# Patient Record
Sex: MALE | Race: OTHER | HISPANIC OR LATINO | URBAN - METROPOLITAN AREA
[De-identification: names, ages, dates, MRNs, and addresses within clinical notes are randomized per-mention and may not be internally consistent; named-entity substitution may affect disease eponyms.]

---

## 2022-08-18 ENCOUNTER — INPATIENT (INPATIENT)
Facility: HOSPITAL | Age: 67
LOS: 7 days | Discharge: NURSING, REHAB OR LONG-TERM CARE | DRG: 919 | End: 2022-08-26
Attending: HOSPITALIST | Admitting: HOSPITALIST
Payer: MEDICARE

## 2022-08-18 VITALS
SYSTOLIC BLOOD PRESSURE: 97 MMHG | OXYGEN SATURATION: 92 % | TEMPERATURE: 99 F | DIASTOLIC BLOOD PRESSURE: 56 MMHG | RESPIRATION RATE: 20 BRPM | HEART RATE: 80 BPM | WEIGHT: 265 LBS | HEIGHT: 67 IN

## 2022-08-18 DIAGNOSIS — D50.0 IRON DEFICIENCY ANEMIA SECONDARY TO BLOOD LOSS (CHRONIC): ICD-10-CM

## 2022-08-18 DIAGNOSIS — Z98.890 OTHER SPECIFIED POSTPROCEDURAL STATES: Chronic | ICD-10-CM

## 2022-08-18 DIAGNOSIS — Z98.1 ARTHRODESIS STATUS: Chronic | ICD-10-CM

## 2022-08-18 LAB
ABO RH CONFIRMATION: SIGNIFICANT CHANGE UP
ALBUMIN SERPL ELPH-MCNC: 2.2 G/DL — LOW (ref 3.3–5)
ALP SERPL-CCNC: 115 U/L — SIGNIFICANT CHANGE UP (ref 40–120)
ALT FLD-CCNC: 36 U/L — SIGNIFICANT CHANGE UP (ref 12–78)
ANION GAP SERPL CALC-SCNC: 4 MMOL/L — LOW (ref 5–17)
APPEARANCE UR: CLEAR — SIGNIFICANT CHANGE UP
AST SERPL-CCNC: 24 U/L — SIGNIFICANT CHANGE UP (ref 15–37)
BASOPHILS # BLD AUTO: 0.02 K/UL — SIGNIFICANT CHANGE UP (ref 0–0.2)
BASOPHILS NFR BLD AUTO: 0.4 % — SIGNIFICANT CHANGE UP (ref 0–2)
BILIRUB SERPL-MCNC: 0.3 MG/DL — SIGNIFICANT CHANGE UP (ref 0.2–1.2)
BILIRUB UR-MCNC: NEGATIVE — SIGNIFICANT CHANGE UP
BUN SERPL-MCNC: 17 MG/DL — SIGNIFICANT CHANGE UP (ref 7–23)
CALCIUM SERPL-MCNC: 9.2 MG/DL — SIGNIFICANT CHANGE UP (ref 8.5–10.1)
CHLORIDE SERPL-SCNC: 103 MMOL/L — SIGNIFICANT CHANGE UP (ref 96–108)
CO2 SERPL-SCNC: 33 MMOL/L — HIGH (ref 22–31)
COLOR SPEC: YELLOW — SIGNIFICANT CHANGE UP
CREAT SERPL-MCNC: 1.08 MG/DL — SIGNIFICANT CHANGE UP (ref 0.5–1.3)
DIFF PNL FLD: NEGATIVE — SIGNIFICANT CHANGE UP
EGFR: 76 ML/MIN/1.73M2 — SIGNIFICANT CHANGE UP
EOSINOPHIL # BLD AUTO: 0.1 K/UL — SIGNIFICANT CHANGE UP (ref 0–0.5)
EOSINOPHIL NFR BLD AUTO: 2 % — SIGNIFICANT CHANGE UP (ref 0–6)
GLUCOSE SERPL-MCNC: 103 MG/DL — HIGH (ref 70–99)
GLUCOSE UR QL: NEGATIVE — SIGNIFICANT CHANGE UP
HCT VFR BLD CALC: 24.1 % — LOW (ref 39–50)
HCT VFR BLD CALC: 24.4 % — LOW (ref 39–50)
HGB BLD-MCNC: 7.5 G/DL — LOW (ref 13–17)
HGB BLD-MCNC: 7.6 G/DL — LOW (ref 13–17)
IMM GRANULOCYTES NFR BLD AUTO: 0.8 % — SIGNIFICANT CHANGE UP (ref 0–1.5)
KETONES UR-MCNC: NEGATIVE — SIGNIFICANT CHANGE UP
LEUKOCYTE ESTERASE UR-ACNC: NEGATIVE — SIGNIFICANT CHANGE UP
LYMPHOCYTES # BLD AUTO: 0.96 K/UL — LOW (ref 1–3.3)
LYMPHOCYTES # BLD AUTO: 18.8 % — SIGNIFICANT CHANGE UP (ref 13–44)
MCHC RBC-ENTMCNC: 28.8 PG — SIGNIFICANT CHANGE UP (ref 27–34)
MCHC RBC-ENTMCNC: 28.8 PG — SIGNIFICANT CHANGE UP (ref 27–34)
MCHC RBC-ENTMCNC: 31.1 GM/DL — LOW (ref 32–36)
MCHC RBC-ENTMCNC: 31.1 GM/DL — LOW (ref 32–36)
MCV RBC AUTO: 92.4 FL — SIGNIFICANT CHANGE UP (ref 80–100)
MCV RBC AUTO: 92.7 FL — SIGNIFICANT CHANGE UP (ref 80–100)
MONOCYTES # BLD AUTO: 0.38 K/UL — SIGNIFICANT CHANGE UP (ref 0–0.9)
MONOCYTES NFR BLD AUTO: 7.4 % — SIGNIFICANT CHANGE UP (ref 2–14)
NEUTROPHILS # BLD AUTO: 3.61 K/UL — SIGNIFICANT CHANGE UP (ref 1.8–7.4)
NEUTROPHILS NFR BLD AUTO: 70.6 % — SIGNIFICANT CHANGE UP (ref 43–77)
NITRITE UR-MCNC: NEGATIVE — SIGNIFICANT CHANGE UP
NT-PROBNP SERPL-SCNC: 108 PG/ML — SIGNIFICANT CHANGE UP (ref 0–125)
PH UR: 7 — SIGNIFICANT CHANGE UP (ref 5–8)
PLATELET # BLD AUTO: 339 K/UL — SIGNIFICANT CHANGE UP (ref 150–400)
PLATELET # BLD AUTO: 352 K/UL — SIGNIFICANT CHANGE UP (ref 150–400)
POTASSIUM SERPL-MCNC: 4.4 MMOL/L — SIGNIFICANT CHANGE UP (ref 3.5–5.3)
POTASSIUM SERPL-SCNC: 4.4 MMOL/L — SIGNIFICANT CHANGE UP (ref 3.5–5.3)
PROT SERPL-MCNC: 6 GM/DL — SIGNIFICANT CHANGE UP (ref 6–8.3)
PROT UR-MCNC: NEGATIVE — SIGNIFICANT CHANGE UP
RBC # BLD: 2.6 M/UL — LOW (ref 4.2–5.8)
RBC # BLD: 2.64 M/UL — LOW (ref 4.2–5.8)
RBC # FLD: 15.1 % — HIGH (ref 10.3–14.5)
RBC # FLD: 15.1 % — HIGH (ref 10.3–14.5)
SODIUM SERPL-SCNC: 140 MMOL/L — SIGNIFICANT CHANGE UP (ref 135–145)
SP GR SPEC: 1 — LOW (ref 1.01–1.02)
TROPONIN I, HIGH SENSITIVITY RESULT: 6.48 NG/L — SIGNIFICANT CHANGE UP
UROBILINOGEN FLD QL: NEGATIVE — SIGNIFICANT CHANGE UP
WBC # BLD: 4.28 K/UL — SIGNIFICANT CHANGE UP (ref 3.8–10.5)
WBC # BLD: 5.11 K/UL — SIGNIFICANT CHANGE UP (ref 3.8–10.5)
WBC # FLD AUTO: 4.28 K/UL — SIGNIFICANT CHANGE UP (ref 3.8–10.5)
WBC # FLD AUTO: 5.11 K/UL — SIGNIFICANT CHANGE UP (ref 3.8–10.5)

## 2022-08-18 PROCEDURE — 85018 HEMOGLOBIN: CPT

## 2022-08-18 PROCEDURE — 85014 HEMATOCRIT: CPT

## 2022-08-18 PROCEDURE — 86850 RBC ANTIBODY SCREEN: CPT

## 2022-08-18 PROCEDURE — 99053 MED SERV 10PM-8AM 24 HR FAC: CPT

## 2022-08-18 PROCEDURE — 72100 X-RAY EXAM L-S SPINE 2/3 VWS: CPT | Mod: 26

## 2022-08-18 PROCEDURE — 93005 ELECTROCARDIOGRAM TRACING: CPT

## 2022-08-18 PROCEDURE — 80053 COMPREHEN METABOLIC PANEL: CPT

## 2022-08-18 PROCEDURE — 99285 EMERGENCY DEPT VISIT HI MDM: CPT

## 2022-08-18 PROCEDURE — 93971 EXTREMITY STUDY: CPT | Mod: RT

## 2022-08-18 PROCEDURE — 97162 PT EVAL MOD COMPLEX 30 MIN: CPT | Mod: GP

## 2022-08-18 PROCEDURE — 85027 COMPLETE CBC AUTOMATED: CPT

## 2022-08-18 PROCEDURE — 72158 MRI LUMBAR SPINE W/O & W/DYE: CPT

## 2022-08-18 PROCEDURE — 72157 MRI CHEST SPINE W/O & W/DYE: CPT

## 2022-08-18 PROCEDURE — 80048 BASIC METABOLIC PNL TOTAL CA: CPT

## 2022-08-18 PROCEDURE — 86901 BLOOD TYPING SEROLOGIC RH(D): CPT

## 2022-08-18 PROCEDURE — 86900 BLOOD TYPING SEROLOGIC ABO: CPT

## 2022-08-18 PROCEDURE — 36430 TRANSFUSION BLD/BLD COMPNT: CPT

## 2022-08-18 PROCEDURE — 85025 COMPLETE CBC W/AUTO DIFF WBC: CPT

## 2022-08-18 PROCEDURE — 93971 EXTREMITY STUDY: CPT | Mod: 26,RT

## 2022-08-18 PROCEDURE — 86920 COMPATIBILITY TEST SPIN: CPT

## 2022-08-18 PROCEDURE — 82570 ASSAY OF URINE CREATININE: CPT

## 2022-08-18 PROCEDURE — 81003 URINALYSIS AUTO W/O SCOPE: CPT

## 2022-08-18 PROCEDURE — 74177 CT ABD & PELVIS W/CONTRAST: CPT | Mod: 26

## 2022-08-18 PROCEDURE — 74177 CT ABD & PELVIS W/CONTRAST: CPT

## 2022-08-18 PROCEDURE — U0005: CPT

## 2022-08-18 PROCEDURE — 97530 THERAPEUTIC ACTIVITIES: CPT | Mod: GP

## 2022-08-18 PROCEDURE — 84156 ASSAY OF PROTEIN URINE: CPT

## 2022-08-18 PROCEDURE — 86803 HEPATITIS C AB TEST: CPT

## 2022-08-18 PROCEDURE — U0003: CPT

## 2022-08-18 PROCEDURE — 72100 X-RAY EXAM L-S SPINE 2/3 VWS: CPT

## 2022-08-18 PROCEDURE — 93306 TTE W/DOPPLER COMPLETE: CPT

## 2022-08-18 PROCEDURE — 97116 GAIT TRAINING THERAPY: CPT | Mod: GP

## 2022-08-18 PROCEDURE — 36415 COLL VENOUS BLD VENIPUNCTURE: CPT

## 2022-08-18 PROCEDURE — 99223 1ST HOSP IP/OBS HIGH 75: CPT

## 2022-08-18 PROCEDURE — A9579: CPT

## 2022-08-18 PROCEDURE — 71045 X-RAY EXAM CHEST 1 VIEW: CPT | Mod: 26

## 2022-08-18 PROCEDURE — P9016: CPT

## 2022-08-18 PROCEDURE — 76870 US EXAM SCROTUM: CPT

## 2022-08-18 RX ORDER — ACETAMINOPHEN 500 MG
650 TABLET ORAL ONCE
Refills: 0 | Status: COMPLETED | OUTPATIENT
Start: 2022-08-18 | End: 2022-08-18

## 2022-08-18 RX ORDER — HYDROMORPHONE HYDROCHLORIDE 2 MG/ML
0.5 INJECTION INTRAMUSCULAR; INTRAVENOUS; SUBCUTANEOUS EVERY 4 HOURS
Refills: 0 | Status: DISCONTINUED | OUTPATIENT
Start: 2022-08-18 | End: 2022-08-25

## 2022-08-18 RX ORDER — NALOXONE HYDROCHLORIDE 4 MG/.1ML
0 SPRAY NASAL
Qty: 0 | Refills: 0 | DISCHARGE

## 2022-08-18 RX ORDER — ONDANSETRON 8 MG/1
4 TABLET, FILM COATED ORAL EVERY 8 HOURS
Refills: 0 | Status: DISCONTINUED | OUTPATIENT
Start: 2022-08-18 | End: 2022-08-26

## 2022-08-18 RX ORDER — HYDROMORPHONE HYDROCHLORIDE 2 MG/ML
0.25 INJECTION INTRAMUSCULAR; INTRAVENOUS; SUBCUTANEOUS EVERY 4 HOURS
Refills: 0 | Status: DISCONTINUED | OUTPATIENT
Start: 2022-08-18 | End: 2022-08-25

## 2022-08-18 RX ORDER — ACETAMINOPHEN 500 MG
1000 TABLET ORAL EVERY 8 HOURS
Refills: 0 | Status: DISCONTINUED | OUTPATIENT
Start: 2022-08-18 | End: 2022-08-26

## 2022-08-18 RX ORDER — POLYETHYLENE GLYCOL 3350 17 G/17G
17 POWDER, FOR SOLUTION ORAL DAILY
Refills: 0 | Status: DISCONTINUED | OUTPATIENT
Start: 2022-08-18 | End: 2022-08-26

## 2022-08-18 RX ORDER — APIXABAN 2.5 MG/1
1 TABLET, FILM COATED ORAL
Qty: 0 | Refills: 0 | DISCHARGE

## 2022-08-18 RX ORDER — HYDROMORPHONE HYDROCHLORIDE 2 MG/ML
2 INJECTION INTRAMUSCULAR; INTRAVENOUS; SUBCUTANEOUS ONCE
Refills: 0 | Status: DISCONTINUED | OUTPATIENT
Start: 2022-08-18 | End: 2022-08-18

## 2022-08-18 RX ORDER — SENNA PLUS 8.6 MG/1
2 TABLET ORAL
Qty: 0 | Refills: 0 | DISCHARGE

## 2022-08-18 RX ORDER — POLYETHYLENE GLYCOL 3350 17 G/17G
17 POWDER, FOR SOLUTION ORAL
Qty: 0 | Refills: 0 | DISCHARGE

## 2022-08-18 RX ORDER — LANOLIN ALCOHOL/MO/W.PET/CERES
3 CREAM (GRAM) TOPICAL AT BEDTIME
Refills: 0 | Status: DISCONTINUED | OUTPATIENT
Start: 2022-08-18 | End: 2022-08-26

## 2022-08-18 RX ORDER — HYDROMORPHONE HYDROCHLORIDE 2 MG/ML
1 INJECTION INTRAMUSCULAR; INTRAVENOUS; SUBCUTANEOUS
Qty: 0 | Refills: 0 | DISCHARGE

## 2022-08-18 RX ORDER — AMIODARONE HYDROCHLORIDE 400 MG/1
1 TABLET ORAL
Qty: 0 | Refills: 0 | DISCHARGE
Start: 2022-08-18

## 2022-08-18 RX ORDER — LOSARTAN POTASSIUM 100 MG/1
100 TABLET, FILM COATED ORAL DAILY
Refills: 0 | Status: DISCONTINUED | OUTPATIENT
Start: 2022-08-18 | End: 2022-08-26

## 2022-08-18 RX ORDER — FUROSEMIDE 40 MG
20 TABLET ORAL ONCE
Refills: 0 | Status: COMPLETED | OUTPATIENT
Start: 2022-08-18 | End: 2022-08-18

## 2022-08-18 RX ORDER — AMIODARONE HYDROCHLORIDE 400 MG/1
200 TABLET ORAL DAILY
Refills: 0 | Status: DISCONTINUED | OUTPATIENT
Start: 2022-08-18 | End: 2022-08-26

## 2022-08-18 RX ORDER — SENNA PLUS 8.6 MG/1
2 TABLET ORAL AT BEDTIME
Refills: 0 | Status: DISCONTINUED | OUTPATIENT
Start: 2022-08-18 | End: 2022-08-26

## 2022-08-18 RX ORDER — NALOXONE HYDROCHLORIDE 4 MG/.1ML
0.4 SPRAY NASAL ONCE
Refills: 0 | Status: DISCONTINUED | OUTPATIENT
Start: 2022-08-18 | End: 2022-08-26

## 2022-08-18 RX ORDER — HYDROMORPHONE HYDROCHLORIDE 2 MG/ML
2 INJECTION INTRAMUSCULAR; INTRAVENOUS; SUBCUTANEOUS EVERY 4 HOURS
Refills: 0 | Status: DISCONTINUED | OUTPATIENT
Start: 2022-08-18 | End: 2022-08-25

## 2022-08-18 RX ADMIN — Medication 1000 MILLIGRAM(S): at 21:09

## 2022-08-18 RX ADMIN — HYDROMORPHONE HYDROCHLORIDE 2 MILLIGRAM(S): 2 INJECTION INTRAMUSCULAR; INTRAVENOUS; SUBCUTANEOUS at 19:28

## 2022-08-18 RX ADMIN — HYDROMORPHONE HYDROCHLORIDE 2 MILLIGRAM(S): 2 INJECTION INTRAMUSCULAR; INTRAVENOUS; SUBCUTANEOUS at 01:23

## 2022-08-18 RX ADMIN — SENNA PLUS 2 TABLET(S): 8.6 TABLET ORAL at 21:10

## 2022-08-18 RX ADMIN — Medication 150 MILLIGRAM(S): at 21:10

## 2022-08-18 RX ADMIN — Medication 1000 MILLIGRAM(S): at 22:27

## 2022-08-18 RX ADMIN — Medication 650 MILLIGRAM(S): at 11:35

## 2022-08-18 RX ADMIN — HYDROMORPHONE HYDROCHLORIDE 2 MILLIGRAM(S): 2 INJECTION INTRAMUSCULAR; INTRAVENOUS; SUBCUTANEOUS at 09:54

## 2022-08-18 NOTE — CONSULT NOTE ADULT - ATTENDING COMMENTS
Above note reviewed.    The patient recently underwent a T11 to pelvis spinal decompression and fusion performed at [facility].  The patient had a complicated postoperative course.  The patient has been in rehab.  Since the surgery, the patient and patient's family report that he has had a right-sided radicular pain.  They report they have recently been unhappy with the patient's pain control.  They deny any significant change recently regarding his surgical site pain, radicular symptoms, numbness, paresthesias, or other complaints.    The imaging performed thus far has been reassuring.  CT abdomen and pelvis does not show any active extravasation.  There is comment of a retroperitoneal bleed that is small.  The T11 to pelvis spinal decompression and fusion appears to be in excellent alignment and hardware positioning.  The MRI of thoracic and lumbar spine with and without contrast is unfortunately limited secondary to artifact, but does not show any definite evidence of concerning fluid collection or other compressive etiology.    Recommend further medical investigation and management of the patient's anemia. There is no acute orthopedic spine intervention necessitated at this time.    The patient's primary surgeon from Health system has been contacted and is aware of the patient's hospital stay.    Rey Terry DO  Orthopedic and Spine Surgeon  [facility]  [email or web address] Above note reviewed.    The patient recently underwent a T11 to pelvis spinal decompression and fusion performed at [facility].  The patient had a complicated postoperative course.  The patient has been in rehab.  Since the surgery, the patient and patient's family report that he has had a right-sided radicular pain.  They report they have recently been unhappy with the patient's pain control.  They deny any significant change recently regarding his surgical site pain, radicular symptoms, numbness, paresthesias, or other complaints.    The imaging performed thus far has been reassuring.  CT abdomen and pelvis does not show any active extravasation.  There is comment of a retroperitoneal bleed that is small.  The T11 to pelvis spinal decompression and fusion appears to be in excellent alignment and hardware positioning.  The MRI of thoracic and lumbar spine with and without contrast is unfortunately limited secondary to artifact, but does not show any definite evidence of concerning fluid collection or other compressive etiology.    Recommend further medical investigation and management of the patient's anemia. There is no acute orthopedic spine intervention necessitated at this time.    The patient's primary surgeon from [facility] has been contacted and is aware of the patient's hospital stay. The patient may follow-up with his primary surgeon after discharge.  I am more than happy to follow the patient if he so chooses.      Rey Terry, DO  Orthopedic and Spine Surgeon  [facility]  [email or web address]

## 2022-08-18 NOTE — ED PROVIDER NOTE - CLINICAL SUMMARY MEDICAL DECISION MAKING FREE TEXT BOX
pt with back pain from recent spinal surgery no recent trauma, no fever  familyy unhappy with [facility] due to medicaion not being given for pain tonight, requesting a new  rehab will give pt meds for pain get sw in am

## 2022-08-18 NOTE — H&P ADULT - ASSESSMENT
65 y/o M PMHx significant for Hypertension, Atrial fibrillation on Apixaban, morbid obesity, and spinal stenosis complicated by lumbar radiculopathy who was recently admitted to [facility] (7/27/2022 - 8/17/2022) s/p elective two stage spine surgery => 7/27/2022 Stage 1 lumbar spine fusion anterior approach single level, with Lateral lumbar interbody fusion posterior approach multiple levels, and left globus robot assisted lumbar spine, and on 7/29/2022 => Stage 2 Posterior Lumbar Laminectomy Fusion Multiple Level, Iliac Fixation, TLIF L1-L2 (Posterior), Globus Robot Assisted Spine (Posterior). The patient was discharged on 8/17/2022 to [facility] where the patient was found to have worsening back and abdominal pain with distention. As per the patient and patient's daughter at the bedside they stated that the patient was in pain en route to [facility] and while being admitted. In the ED case management was consulted as the patient and family were not satisfied with the care at the facility.  67 y/o M PMHx significant for Hypertension, Atrial fibrillation on Apixaban, morbid obesity, and spinal stenosis complicated by lumbar radiculopathy who was recently admitted to [facility] (7/27/2022 - 8/17/2022) s/p elective two stage spine surgery => 7/27/2022 Stage 1 lumbar spine fusion anterior approach single level, with Lateral lumbar interbody fusion posterior approach multiple levels, and left globus robot assisted lumbar spine, and on 7/29/2022 => Stage 2 Posterior Lumbar Laminectomy Fusion Multiple Level, Iliac Fixation, TLIF L1-L2 (Posterior), Globus Robot Assisted Spine (Posterior). The patient was discharged on 8/17/2022 to [facility] where the patient was found to have worsening back pain with radiation to the right leg, and abdominal pain with distention. As per the patient and patient's daughter at the bedside they stated that the patient was in pain en route to [facility] and while being admitted. In the ED case management was consulted as the patient and family were not satisfied with the care at the facility.     #Anemia with Severe Lower Back Pain Radiating to the Right Leg   ~admit to Medicine  ~Orthopedics/Spine consultation greatly appreciated  ~Type and Screen  ~transfuse prn  ~serial CBCs  ~cont. pain management PRN  ~NWB/WBAT   ~Keep splint/dressing clean and dry.   ~per Orthopedics => do not remove dressing/splint until follow up in the office.   ~f/u CT LS spine  ~f/u CTA Abdomen/Pelvis as concern for possible retroperitoneal bleed vs epidural hematoma   ~cont. NeuroVascular Checks to monitor for compartment signs    #Atrial Fibrillation  ~will hold Apixaban 5mg po bid for now  ~cont. Amiodarone 200mg po daily    #Hypertension  ~patient with labile blood pressures  ~will hold HCTZ (takes 50mg po daily)    #Severe Protein Calorie Malnutrition  ~Albumin 2.2  ~patient w/ notable third spacing  ~f/u w/ Nutrition in the am    #Vte ppx  ~cont. SCDs for now  ~as above will hold Apixaban

## 2022-08-18 NOTE — PATIENT PROFILE ADULT - FALL HARM RISK - HARM RISK INTERVENTIONS
Assistance with ambulation/Assistance OOB with selected safe patient handling equipment/Communicate Risk of Fall with Harm to all staff/Discuss with provider need for PT consult/Monitor gait and stability/Provide patient with walking aids - walker, cane, crutches/Reinforce activity limits and safety measures with patient and family/Sit up slowly, dangle for a short time, stand at bedside before walking/Tailored Fall Risk Interventions/Use of alarms - bed, chair and/or voice tab/Visual Cue: Yellow wristband and red socks/Bed in lowest position, wheels locked, appropriate side rails in place/Call bell, personal items and telephone in reach/Instruct patient to call for assistance before getting out of bed or chair/Non-slip footwear when patient is out of bed/[place] to call system/Physically safe environment - no spills, clutter or unnecessary equipment/Purposeful Proactive Rounding/Room/bathroom lighting operational, light cord in reach

## 2022-08-18 NOTE — CONSULT NOTE ADULT - SUBJECTIVE AND OBJECTIVE BOX
Pt Name: SOUTH MENENDEZ    MRN: [id number]    HPI: Patient is a 66y male with recent surg history of two phase spinal lumbar fusion (7/27, 7/29) c/w common iliac vein repair for which pt had protracted ICU course 7/29-8/17 for Afib, anemia, new onset MR on TTE w BL LE swelling, UTI, and HSV2. Pt was transferred from [facility] ICU to [facility] in [place], however pt was noted to have severe pain at rehab,     PAST MEDICAL & SURGICAL HISTORY:  HTN (hypertension)      Chronic atrial fibrillation      Status post lumbar and lumbosacral fusion by anterior technique      Status post lumbar spine operation  7/27/2022 Stage 1 lumbar spine fusion anterior approach single level, Lateral lumbar interbody fusion posterior approach multiple levels, and left globus robot assisted lumbar spine     7/29/2022 Stage 2 Posterior Lumbar Laminectomy Fusion Multiple Level, Iliac Fixation, TLIF L1-L2 (Posterior), Globus Robot Assisted Spine (Posterior)          Allergies: No Known Allergies      Ambulation: Baseline Ambulation [ ] independent [ ] With Cane [ ] With Walker [ ]  Bedbound [ ] Pivot transfers to Wheelchair only                          7.6    4.28  )-----------( 352      ( 18 Aug 2022 21:16 )             24.4     08-18    140  |  103  |  17  ----------------------------<  103<H>  4.4   |  33<H>  |  1.08    Ca    9.2      18 Aug 2022 11:33    TPro  6.0  /  Alb  2.2<L>  /  TBili  0.3  /  DBili  x   /  AST  24  /  ALT  36  /  AlkPhos  115  08-18          PHYSICAL EXAM:    Vital Signs Last 24 Hrs  T(C): 36.5 (18 Aug 2022 20:32), Max: 37.3 (18 Aug 2022 00:27)  T(F): 97.7 (18 Aug 2022 20:32), Max: 99.2 (18 Aug 2022 12:20)  HR: 83 (18 Aug 2022 20:32) (66 - 85)  BP: 104/61 (18 Aug 2022 20:32) (94/58 - 120/67)  BP(mean): 75 (18 Aug 2022 20:32) (70 - 82)  RR: 17 (18 Aug 2022 20:32) (17 - 20)  SpO2: 98% (18 Aug 2022 20:32) (92% - 100%)    Parameters below as of 18 Aug 2022 20:32  Patient On (Oxygen Delivery Method): room air        Physical Exam:    Spine:    General:  No palpable step off. Nontender to palpation. Dressings c/d/i    Motor:                   C5                C6              C7               C8           T1   R            5/5                5/5            5/5             5/5          5/5  L             5/5               5/5             5/5             5/5          5/5                L2             L3             L4               L5            S1  R         *2/5           5/5          5/5             5/5           5/5  L          *2/5          5/5           5/5             5/5           5/5    *Secondary to back pain    Sensory:            C5         C6         C7      C8       T1        (0=absent, 1=impaired, 2=normal, NT=not testable)  R         2            2           2        2         2  L          2            2           2        2         2               L2          L3         L4      L5       S1         (0=absent, 1=impaired, 2=normal, NT=not testable)  R         2            2            2        2        2  L          2            2           2        2         2    UMNs:    Babinski (-) B/L  Clonus (-) B/L  Cota (-) B/L      Imaging: pending    Orthopedic A/P:    Pt is a  66y Male with severe back pain radiating to R leg.      -Pain control PRN  -NWB/WBAT   -Keep splint/dressing clean and dry.   -ICE and elevate  -Do not remove dressing/splint until follow up in the office.   -DVT ppx?   -F/U Additonal imaging?  -N/V Checks to monitor for compartment signs  -Follow up with  ______ within 1 week. Please call the office to make an appointment.   -Discussed with  ______ who is aware and approves of plan.  Pt Name: SOUTH MENENDEZ    MRN: [id number]    HPI: Patient is a 66y male with recent surg history of two phase spinal lumbar fusion (7/27, 7/29) c/w common iliac vein repair for which pt had protracted ICU course 7/29-8/17 for Afib, anemia, new onset MR on TTE w BL LE swelling, UTI, and HSV2. Pt was transferred from [facility] ICU to [facility] in [place], however pt was noted to have severe pain at rehab, for which daughter had pt BIBEMS to [facility].    PAST MEDICAL & SURGICAL HISTORY:  HTN (hypertension)      Chronic atrial fibrillation      Status post lumbar and lumbosacral fusion by anterior technique      Status post lumbar spine operation  7/27/2022 Stage 1 lumbar spine fusion anterior approach single level, Lateral lumbar interbody fusion posterior approach multiple levels, and left globus robot assisted lumbar spine     7/29/2022 Stage 2 Posterior Lumbar Laminectomy Fusion Multiple Level, Iliac Fixation, TLIF L1-L2 (Posterior), Globus Robot Assisted Spine (Posterior)          Allergies: No Known Allergies      Ambulation: Baseline Ambulation [ ] independent [ ] With Cane [ ] With Walker [ ]  Bedbound [ ] Pivot transfers to Wheelchair only                          7.6    4.28  )-----------( 352      ( 18 Aug 2022 21:16 )             24.4     08-18    140  |  103  |  17  ----------------------------<  103<H>  4.4   |  33<H>  |  1.08    Ca    9.2      18 Aug 2022 11:33    TPro  6.0  /  Alb  2.2<L>  /  TBili  0.3  /  DBili  x   /  AST  24  /  ALT  36  /  AlkPhos  115  08-18          PHYSICAL EXAM:    Vital Signs Last 24 Hrs  T(C): 36.5 (18 Aug 2022 20:32), Max: 37.3 (18 Aug 2022 00:27)  T(F): 97.7 (18 Aug 2022 20:32), Max: 99.2 (18 Aug 2022 12:20)  HR: 83 (18 Aug 2022 20:32) (66 - 85)  BP: 104/61 (18 Aug 2022 20:32) (94/58 - 120/67)  BP(mean): 75 (18 Aug 2022 20:32) (70 - 82)  RR: 17 (18 Aug 2022 20:32) (17 - 20)  SpO2: 98% (18 Aug 2022 20:32) (92% - 100%)    Parameters below as of 18 Aug 2022 20:32  Patient On (Oxygen Delivery Method): room air        Physical Exam:    Spine:    General:  No palpable step off. Nontender to palpation. Dressings c/d/i    Motor:                   C5                C6              C7               C8           T1   R            5/5                5/5            5/5             5/5          5/5  L             5/5               5/5             5/5             5/5          5/5                L2             L3             L4               L5            S1  R         *2/5           5/5          5/5             5/5           5/5  L          *2/5          5/5           5/5             5/5           5/5    *Secondary to back pain    Sensory:            C5         C6         C7      C8       T1        (0=absent, 1=impaired, 2=normal, NT=not testable)  R         2            2           2        2         2  L          2            2           2        2         2               L2          L3         L4      L5       S1         (0=absent, 1=impaired, 2=normal, NT=not testable)  R         2            2            2        2        2  L          2            2           2        2         2    UMNs:    Babinski (-) B/L  Clonus (-) B/L  Cota (-) B/L      Imaging: pending    Orthopedic A/P:    Pt is a  66y Male with severe back pain radiating to R leg.      - Pain control PRN  - WBAT  - Hold DVT ppx due to common iliac vein repair during spinal fusion surgery, anemia  - F/U CT abd/p w contrast, CT Lsp to assess possible bleed, hardware placement  - Follow up with Dr. Terry within 1 week. Please call the office to make an appointment.   - Plan discussed with patient, who is in agreement with the above  - Discussed with Dr. Terry who is aware and approves of plan.  Pt Name: SOUTH MENENDEZ    MRN: [id number]    HPI: Patient is a 66y male with recent surg history of two phase spinal lumbar fusion (7/27, 7/29) c/w common iliac vein repair for which pt had protracted ICU course 7/29-8/17 for Afib, anemia, new onset MR on TTE w BL LE swelling, UTI, and HSV2. Pt was transferred from [facility] ICU to [facility] in [place], however pt was noted to have severe pain at rehab, for which daughter had pt BIBEMS to [facility].    PAST MEDICAL & SURGICAL HISTORY:  HTN (hypertension)      Chronic atrial fibrillation      Status post lumbar and lumbosacral fusion by anterior technique      Status post lumbar spine operation  7/27/2022 Stage 1 lumbar spine fusion anterior approach single level, Lateral lumbar interbody fusion posterior approach multiple levels, and left globus robot assisted lumbar spine     7/29/2022 Stage 2 Posterior Lumbar Laminectomy Fusion Multiple Level, Iliac Fixation, TLIF L1-L2 (Posterior), Globus Robot Assisted Spine (Posterior)          Allergies: No Known Allergies      Ambulation: Baseline Ambulation [ ] independent [ ] With Cane [ ] With Walker [ ]  Bedbound [ ] Pivot transfers to Wheelchair only                          7.6    4.28  )-----------( 352      ( 18 Aug 2022 21:16 )             24.4     08-18    140  |  103  |  17  ----------------------------<  103<H>  4.4   |  33<H>  |  1.08    Ca    9.2      18 Aug 2022 11:33    TPro  6.0  /  Alb  2.2<L>  /  TBili  0.3  /  DBili  x   /  AST  24  /  ALT  36  /  AlkPhos  115  08-18          PHYSICAL EXAM:    Vital Signs Last 24 Hrs  T(C): 36.5 (18 Aug 2022 20:32), Max: 37.3 (18 Aug 2022 00:27)  T(F): 97.7 (18 Aug 2022 20:32), Max: 99.2 (18 Aug 2022 12:20)  HR: 83 (18 Aug 2022 20:32) (66 - 85)  BP: 104/61 (18 Aug 2022 20:32) (94/58 - 120/67)  BP(mean): 75 (18 Aug 2022 20:32) (70 - 82)  RR: 17 (18 Aug 2022 20:32) (17 - 20)  SpO2: 98% (18 Aug 2022 20:32) (92% - 100%)    Parameters below as of 18 Aug 2022 20:32  Patient On (Oxygen Delivery Method): room air        Physical Exam:    Spine:    General:  No palpable step off. Nontender to palpation. Dressings c/d/i    Motor:                   C5                C6              C7               C8           T1   R            5/5                5/5            5/5             5/5          5/5  L             5/5               5/5             5/5             5/5          5/5                L2             L3             L4               L5            S1  R         *2/5           5/5          5/5             5/5           5/5  L          *2/5          5/5           5/5             5/5           5/5    *Secondary to back pain    Sensory:            C5         C6         C7      C8       T1        (0=absent, 1=impaired, 2=normal, NT=not testable)  R         2            2           2        2         2  L          2            2           2        2         2               L2          L3         L4      L5       S1         (0=absent, 1=impaired, 2=normal, NT=not testable)  R         2            2            2        2        2  L          2            2           2        2         2    UMNs:    Babinski (-) B/L  Clonus (-) B/L  Cota (-) B/L      Imaging: pending    Orthopedic A/P:    Pt is a  66y Male with severe back pain radiating to R leg sp Anterior/ Posterior Fusion of T11-Pelvis at outside hospital       - Pain control PRN  - WBAT  - Hold DVT ppx due to common iliac vein repair during spinal fusion surgery, anemia  - F/U CT abd/p w contrast, CT Lsp to assess possible bleed, hardware placement  - Plan discussed with patient, who is in agreement with the above  - Discussed with Dr. Terry who is aware and approves of plan.

## 2022-08-18 NOTE — H&P ADULT - HISTORY OF PRESENT ILLNESS
65 y/o M PMHx significant for Hypertension, Atrial fibrillation on Apixaban, morbid obesity, and spinal stenosis complicated by lumbar radiculopathy who was recently admitted to [facility] (7/27/2022 - 8/17/2022) s/p elective two stage spine surgery => 7/27/2022 Stage 1 lumbar spine fusion anterior approach single level, with Lateral lumbar interbody fusion posterior approach multiple levels, and left globus robot assisted lumbar spine, and on 7/29/2022 => Stage 2 Posterior Lumbar Laminectomy Fusion Multiple Level, Iliac Fixation, TLIF L1-L2 (Posterior), Globus Robot Assisted Spine (Posterior). The patient was discharged on 8/17/2022 to [facility] where the patient  65 y/o M PMHx significant for Hypertension, Atrial fibrillation on Apixaban, morbid obesity, and spinal stenosis complicated by lumbar radiculopathy who was recently admitted to [facility] (7/27/2022 - 8/17/2022) s/p elective two stage spine surgery => 7/27/2022 Stage 1 lumbar spine fusion anterior approach single level, with Lateral lumbar interbody fusion posterior approach multiple levels, and left globus robot assisted lumbar spine, and on 7/29/2022 => Stage 2 Posterior Lumbar Laminectomy Fusion Multiple Level, Iliac Fixation, TLIF L1-L2 (Posterior), Globus Robot Assisted Spine (Posterior). The patient was discharged on 8/17/2022 to [facility] [place] where the patient  67 y/o M PMHx significant for Hypertension, Atrial fibrillation on Apixaban, morbid obesity, and spinal stenosis complicated by lumbar radiculopathy who was recently admitted to [facility] (7/27/2022 - 8/17/2022) s/p elective two stage spine surgery => 7/27/2022 Stage 1 lumbar spine fusion anterior approach single level, with Lateral lumbar interbody fusion posterior approach multiple levels, and left globus robot assisted lumbar spine, and on 7/29/2022 => Stage 2 Posterior Lumbar Laminectomy Fusion Multiple Level, Iliac Fixation, TLIF L1-L2 (Posterior), Globus Robot Assisted Spine (Posterior). The patient was discharged on 8/17/2022 to [facility] where the patient was found to have worsening back and abdominal pain with distention. As per the patient and patient's daughter at the bedside they stated that the patient was in pain en route to [facility] and while being admitted. In the ED case management was consulted as the patient and family were not satisfied with the care at the facility.    65 y/o M PMHx significant for Hypertension, Atrial fibrillation on Apixaban, morbid obesity, and spinal stenosis complicated by lumbar radiculopathy who was recently admitted to [facility] (7/27/2022 - 8/17/2022) s/p elective two stage spine surgery => 7/27/2022 Stage 1 lumbar spine fusion anterior approach single level, with Lateral lumbar interbody fusion posterior approach multiple levels, and left globus robot assisted lumbar spine, and on 7/29/2022 => Stage 2 Posterior Lumbar Laminectomy Fusion Multiple Level, Iliac Fixation, TLIF L1-L2 (Posterior), Globus Robot Assisted Spine (Posterior). The patient was discharged on 8/17/2022 to [facility] where the patient was found to have worsening back pain with radiation to the right leg, and abdominal pain with distention. As per the patient and patient's daughter at the bedside they stated that the patient was in pain en route to [facility] and while being admitted. In the ED case management was consulted as the patient and family were not satisfied with the care at the facility.

## 2022-08-18 NOTE — H&P ADULT - NSHPPHYSICALEXAM_GEN_ALL_CORE
Vital Signs Last 24 Hrs  T(C): 36.5 (18 Aug 2022 20:32), Max: 37.3 (18 Aug 2022 00:27)  T(F): 97.7 (18 Aug 2022 20:32), Max: 99.2 (18 Aug 2022 12:20)  HR: 83 (18 Aug 2022 20:32) (66 - 85)  BP: 104/61 (18 Aug 2022 20:32) (94/58 - 120/67)  BP(mean): 75 (18 Aug 2022 20:32) (70 - 82)  RR: 17 (18 Aug 2022 20:32) (17 - 20)  SpO2: 98% (18 Aug 2022 20:32) (92% - 100%)    Parameters below as of 18 Aug 2022 20:32  Patient On (Oxygen Delivery Method): room air

## 2022-08-18 NOTE — PHARMACOTHERAPY INTERVENTION NOTE - COMMENTS
Medication history complete, reviewed medications with patient provided med list and confirmed with doctor first med hx.

## 2022-08-18 NOTE — ED ADULT NURSE REASSESSMENT NOTE - NS ED NURSE REASSESS COMMENT FT1
Pt resting in bed with no acute distress noted. requesting pain meds. MD called, no answer. Pt updated on their status, the current plan of care, and available results no needs or requests at this time.

## 2022-08-18 NOTE — ED ADULT TRIAGE NOTE - CHIEF COMPLAINT QUOTE
Pt. YUSEF from [facility] with c/o back pain. As per EMS patient had spinal fusion approximately 2 weeks ago. Pt. reports " I had back surgery on July 27th and 29th. Every since the surgery I have been having pain in my back and my right leg. I am in rehab and they do not have the proper services for me and I am not getting pain medications. At 6pm I peed just a little and pooped just a little. My testicles have been swollen since after the surgery." Denies CP, SOB and fevers. No medications prior to arrival.

## 2022-08-18 NOTE — ED ADULT NURSE REASSESSMENT NOTE - NS ED NURSE REASSESS COMMENT FT1
Pt sitting comfortably in bed, AOx3 but c/p pain and requesting pain medication. Daughter at bedside. Pt awaiting neuro surgery consult.

## 2022-08-18 NOTE — ED PROVIDER NOTE - PROGRESS NOTE DETAILS
pt initially seen by dr brown, will be admitted for anemia on elliquis and social admit as pt needs placement but sw unable to find placement today.

## 2022-08-18 NOTE — ED ADULT NURSE REASSESSMENT NOTE - NS ED NURSE REASSESS COMMENT FT1
Pt resting comfortably in bed with no acute distress noted. Pt updated on their status, the current plan of care, and available results no needs or requests at this time.

## 2022-08-18 NOTE — ED PROVIDER NOTE - PHYSICAL EXAMINATION
Gen:  Well appearing in distress with back pain,   Head:  NC/AT  HEENT: pupils perrl,no pharyngeal erythema, uvula midline  Cardiac: S1S2, RRR  Abd: Soft, non tender  Resp: No distress, CTA   musculoskeletal:: no deformities, no swelling, strength +5/+5, no saddle anesthesia, sensation intact and equal to ble  Skin: warm and dry as visualized, no rashes  Neuro: ALLRED, aao x 4  Psych:alert, cooperative, appropriate mood and affect for situation

## 2022-08-18 NOTE — CHART NOTE - NSCHARTNOTEFT_GEN_A_CORE
Chart reviewed. Met with patient and family at bedside. The patient comes to us from [facility]. He was sent there last night from [facility] in [facility] after having extensive spinal surgery. The family and patient were unhappy with his care and stated that the patient was in pain after ambulance ride and that the staff would not medicate him or call the MD on call to get pain meds ordered. They also claimed that the facility was not clean and the daughter showed me pictures that she took at the facility. The patient and family are refusing to return to the facility citing safety concerns. I asked Dr. Martins to order labs and a Covid swab. I then conferred with Addie Boyle and let her know of the family's concerns. She told me that she would contact the administration of [facility] to voice the family's concerns. I prepared a HERON and attached the documents that the family received from [facility] at [place]. I also notified the admitting office of the patient's workers comp status. The family requested that I send the referral [facility] in [place] which I have done. I will also forward to other local facilities. Patient's daughter Karlee Wilcox [phone]. CM department will follow to assist with DC planning

## 2022-08-18 NOTE — H&P ADULT - NSICDXPASTSURGICALHX_GEN_ALL_CORE_FT
PAST SURGICAL HISTORY:  Status post lumbar and lumbosacral fusion by anterior technique     Status post lumbar spine operation 7/27/2022 Stage 1 lumbar spine fusion anterior approach single level, Lateral lumbar interbody fusion posterior approach multiple levels, and left globus robot assisted lumbar spine   7/29/2022 Stage 2 Posterior Lumbar Laminectomy Fusion Multiple Level, Iliac Fixation, TLIF L1-L2 (Posterior), Globus Robot Assisted Spine (Posterior)

## 2022-08-18 NOTE — ED ADULT TRIAGE NOTE - HAVE YOU HAD A SECOND COVID-19 BOOSTER?
Problem: Anemia (Chemotherapy Effects)  Goal: Anemia Symptom Improvement  Outcome: Ongoing, Not Progressing     Problem: Nausea and Vomiting (Chemotherapy Effects)  Goal: Fluid and Electrolyte Balance  Outcome: Ongoing, Not Progressing     Problem: Neutropenia (Chemotherapy Effects)  Goal: Absence of Infection  Outcome: Ongoing, Not Progressing     Problem: Infection  Goal: Infection Symptom Resolution  Outcome: Ongoing, Not Progressing      No

## 2022-08-18 NOTE — ED ADULT NURSE NOTE - CHIEF COMPLAINT QUOTE
Pt. YUSEF from [facility] with c/o back pain. As per EMS patient had spinal fusion approximately 2 weeks ago. Pt. reports " I had back surgery on July 27th and 29th. Every since the surgery I have been having pain in my back and my right leg. I am in rehab and they do not have the proper services for me and I am not getting pain medications. At 6pm I peed just a little and pooped just a little. My testicles have been swollen since after the surgery." Denies CP, SOB and fevers. No medications prior to arrival.

## 2022-08-18 NOTE — ED PROVIDER NOTE - OBJECTIVE STATEMENT
67 yo male, overweight biba from [facility], [place], operated by [facility] where he was admitted from [facility] earlier today after extensive  multiple anterior and posterior spinal fusions in the last 2 weeks.  Family called [phone] when her father was in pain and the facility stated they  could not give pt pain medications because doctor there had to sign off and doctor was not there and as per daughter they wouldn't call him.

## 2022-08-18 NOTE — ED ADULT NURSE NOTE - OBJECTIVE STATEMENT
66 year old male found laying on stretcher complaining of back pain and testicular swelling x2 weeks.  pt had spinal surgery 2 weeks ago and has been staying in rehab since.  pt does not like the way hes being treated at rehab.

## 2022-08-19 LAB
ALBUMIN SERPL ELPH-MCNC: 2.1 G/DL — LOW (ref 3.3–5)
ALP SERPL-CCNC: 123 U/L — HIGH (ref 40–120)
ALT FLD-CCNC: 32 U/L — SIGNIFICANT CHANGE UP (ref 12–78)
ANION GAP SERPL CALC-SCNC: 4 MMOL/L — LOW (ref 5–17)
AST SERPL-CCNC: 11 U/L — LOW (ref 15–37)
BILIRUB SERPL-MCNC: 0.3 MG/DL — SIGNIFICANT CHANGE UP (ref 0.2–1.2)
BUN SERPL-MCNC: 13 MG/DL — SIGNIFICANT CHANGE UP (ref 7–23)
CALCIUM SERPL-MCNC: 9 MG/DL — SIGNIFICANT CHANGE UP (ref 8.5–10.1)
CHLORIDE SERPL-SCNC: 106 MMOL/L — SIGNIFICANT CHANGE UP (ref 96–108)
CO2 SERPL-SCNC: 32 MMOL/L — HIGH (ref 22–31)
CREAT SERPL-MCNC: 0.89 MG/DL — SIGNIFICANT CHANGE UP (ref 0.5–1.3)
EGFR: 95 ML/MIN/1.73M2 — SIGNIFICANT CHANGE UP
GLUCOSE SERPL-MCNC: 101 MG/DL — HIGH (ref 70–99)
HCT VFR BLD CALC: 23.8 % — LOW (ref 39–50)
HCT VFR BLD CALC: 26.9 % — LOW (ref 39–50)
HCT VFR BLD CALC: 26.9 % — LOW (ref 39–50)
HCT VFR BLD CALC: 27.5 % — LOW (ref 39–50)
HCV AB S/CO SERPL IA: 0.11 S/CO — SIGNIFICANT CHANGE UP (ref 0–0.99)
HCV AB SERPL-IMP: SIGNIFICANT CHANGE UP
HGB BLD-MCNC: 7.4 G/DL — LOW (ref 13–17)
HGB BLD-MCNC: 8.4 G/DL — LOW (ref 13–17)
HGB BLD-MCNC: 8.5 G/DL — LOW (ref 13–17)
HGB BLD-MCNC: 8.7 G/DL — LOW (ref 13–17)
MCHC RBC-ENTMCNC: 28.9 PG — SIGNIFICANT CHANGE UP (ref 27–34)
MCHC RBC-ENTMCNC: 29.1 PG — SIGNIFICANT CHANGE UP (ref 27–34)
MCHC RBC-ENTMCNC: 29.3 PG — SIGNIFICANT CHANGE UP (ref 27–34)
MCHC RBC-ENTMCNC: 31.1 GM/DL — LOW (ref 32–36)
MCHC RBC-ENTMCNC: 31.2 GM/DL — LOW (ref 32–36)
MCHC RBC-ENTMCNC: 31.6 GM/DL — LOW (ref 32–36)
MCV RBC AUTO: 92.4 FL — SIGNIFICANT CHANGE UP (ref 80–100)
MCV RBC AUTO: 92.6 FL — SIGNIFICANT CHANGE UP (ref 80–100)
MCV RBC AUTO: 93.7 FL — SIGNIFICANT CHANGE UP (ref 80–100)
PLATELET # BLD AUTO: 359 K/UL — SIGNIFICANT CHANGE UP (ref 150–400)
PLATELET # BLD AUTO: 364 K/UL — SIGNIFICANT CHANGE UP (ref 150–400)
PLATELET # BLD AUTO: 369 K/UL — SIGNIFICANT CHANGE UP (ref 150–400)
POTASSIUM SERPL-MCNC: 4 MMOL/L — SIGNIFICANT CHANGE UP (ref 3.5–5.3)
POTASSIUM SERPL-SCNC: 4 MMOL/L — SIGNIFICANT CHANGE UP (ref 3.5–5.3)
PROT SERPL-MCNC: 5.8 GM/DL — LOW (ref 6–8.3)
RBC # BLD: 2.54 M/UL — LOW (ref 4.2–5.8)
RBC # BLD: 2.91 M/UL — LOW (ref 4.2–5.8)
RBC # BLD: 2.97 M/UL — LOW (ref 4.2–5.8)
RBC # FLD: 15.1 % — HIGH (ref 10.3–14.5)
RBC # FLD: 15.1 % — HIGH (ref 10.3–14.5)
RBC # FLD: 15.4 % — HIGH (ref 10.3–14.5)
SODIUM SERPL-SCNC: 142 MMOL/L — SIGNIFICANT CHANGE UP (ref 135–145)
WBC # BLD: 3.89 K/UL — SIGNIFICANT CHANGE UP (ref 3.8–10.5)
WBC # BLD: 4.08 K/UL — SIGNIFICANT CHANGE UP (ref 3.8–10.5)
WBC # BLD: 4.27 K/UL — SIGNIFICANT CHANGE UP (ref 3.8–10.5)
WBC # FLD AUTO: 3.89 K/UL — SIGNIFICANT CHANGE UP (ref 3.8–10.5)
WBC # FLD AUTO: 4.08 K/UL — SIGNIFICANT CHANGE UP (ref 3.8–10.5)
WBC # FLD AUTO: 4.27 K/UL — SIGNIFICANT CHANGE UP (ref 3.8–10.5)

## 2022-08-19 PROCEDURE — 72157 MRI CHEST SPINE W/O & W/DYE: CPT | Mod: 26

## 2022-08-19 PROCEDURE — 76870 US EXAM SCROTUM: CPT | Mod: 26

## 2022-08-19 PROCEDURE — 99232 SBSQ HOSP IP/OBS MODERATE 35: CPT

## 2022-08-19 PROCEDURE — 72158 MRI LUMBAR SPINE W/O & W/DYE: CPT | Mod: 26

## 2022-08-19 RX ORDER — GABAPENTIN 400 MG/1
300 CAPSULE ORAL THREE TIMES A DAY
Refills: 0 | Status: DISCONTINUED | OUTPATIENT
Start: 2022-08-19 | End: 2022-08-26

## 2022-08-19 RX ORDER — ACETAMINOPHEN 500 MG
1000 TABLET ORAL ONCE
Refills: 0 | Status: COMPLETED | OUTPATIENT
Start: 2022-08-19 | End: 2022-08-19

## 2022-08-19 RX ADMIN — GABAPENTIN 300 MILLIGRAM(S): 400 CAPSULE ORAL at 21:58

## 2022-08-19 RX ADMIN — HYDROMORPHONE HYDROCHLORIDE 2 MILLIGRAM(S): 2 INJECTION INTRAMUSCULAR; INTRAVENOUS; SUBCUTANEOUS at 01:58

## 2022-08-19 RX ADMIN — HYDROMORPHONE HYDROCHLORIDE 0.5 MILLIGRAM(S): 2 INJECTION INTRAMUSCULAR; INTRAVENOUS; SUBCUTANEOUS at 16:46

## 2022-08-19 RX ADMIN — AMIODARONE HYDROCHLORIDE 200 MILLIGRAM(S): 400 TABLET ORAL at 10:25

## 2022-08-19 RX ADMIN — Medication 1000 MILLIGRAM(S): at 22:02

## 2022-08-19 RX ADMIN — SENNA PLUS 2 TABLET(S): 8.6 TABLET ORAL at 21:58

## 2022-08-19 RX ADMIN — Medication 150 MILLIGRAM(S): at 10:24

## 2022-08-19 RX ADMIN — HYDROMORPHONE HYDROCHLORIDE 2 MILLIGRAM(S): 2 INJECTION INTRAMUSCULAR; INTRAVENOUS; SUBCUTANEOUS at 22:48

## 2022-08-19 RX ADMIN — Medication 1000 MILLIGRAM(S): at 06:46

## 2022-08-19 RX ADMIN — Medication 400 MILLIGRAM(S): at 14:43

## 2022-08-19 RX ADMIN — POLYETHYLENE GLYCOL 3350 17 GRAM(S): 17 POWDER, FOR SOLUTION ORAL at 10:25

## 2022-08-19 RX ADMIN — HYDROMORPHONE HYDROCHLORIDE 0.5 MILLIGRAM(S): 2 INJECTION INTRAMUSCULAR; INTRAVENOUS; SUBCUTANEOUS at 10:24

## 2022-08-19 RX ADMIN — HYDROMORPHONE HYDROCHLORIDE 2 MILLIGRAM(S): 2 INJECTION INTRAMUSCULAR; INTRAVENOUS; SUBCUTANEOUS at 21:58

## 2022-08-19 RX ADMIN — HYDROMORPHONE HYDROCHLORIDE 2 MILLIGRAM(S): 2 INJECTION INTRAMUSCULAR; INTRAVENOUS; SUBCUTANEOUS at 01:08

## 2022-08-19 RX ADMIN — Medication 1000 MILLIGRAM(S): at 21:58

## 2022-08-19 NOTE — CONSULT NOTE ADULT - ASSESSMENT
66 year old male presents with acute anemia s/p elective two stage spine surgery => 7/27/2022. HH appears stable around 7.4-7.6/24. Patient receiving 1 U PRBC. No concern for compartment syndrome at this time. Neurovascularly intact at this time.    Plan:  Monitor vitals  Trend HH if weary of bleeding q 6, transfuse as needed  Hold Eliquis if concerned about bleeding  Follow up imaging to rule out hematoma. IR for embolization if extravasation seen.  Orthopedic surgery recommendations  Recommend follow up with [facility] surgeons once stable  Physical therapy  No surgical intervention at this time. Reconsult if needed. Thanks for the consult.  Rest of management as per primary team.    Plan discussed with Dr. Wise.   66 year old male presents with acute anemia s/p elective two stage spine surgery => 7/27/2022. HH appears stable around 7.4-7.6/24. Patient receiving 1 U PRBC. No concern for compartment syndrome at this time. Neurovascularly intact at this time. Small left RP hematoma, no intraabdominal hemorrhage.    Plan:  Monitor vitals  Trend HH if weary of bleeding q 6, transfuse as needed  Hold Eliquis if concerned about bleeding  Care as per Orthopedic surgery since this was an Orthopedic intervention and patient's care would be best managed by them  Recommend follow up with [facility] surgeons once stable  Physical therapy  No general surgical intervention at this time. Reconsult if needed. Thanks for the consult.  Rest of management as per primary team.    Plan discussed with Dr. Wise.

## 2022-08-19 NOTE — PROGRESS NOTE ADULT - SUBJECTIVE AND OBJECTIVE BOX
Pt Name: SOUTH MENENDEZ    MRN: [id number]    HPI: Patient is a 66y male with recent surg history of two phase spinal lumbar fusion (, ) c/w common iliac vein repair for which pt had protracted ICU course - for Afib, anemia, new onset MR on TTE w BL LE swelling, UTI, and HSV2.    Patient with Persistent Back pain with RLE radiculopathy. Persistent swelling of BLLE and Scrotum. CT prelim demonstrating fluid collection in retroperitoneal space.    LABS:                        7.4    4.08  )-----------( 369      ( 19 Aug 2022 00:52 )             23.8         140  |  103  |  17  ----------------------------<  103<H>  4.4   |  33<H>  |  1.08    Ca    9.2      18 Aug 2022 11:33    TPro  6.0  /  Alb  2.2<L>  /  TBili  0.3  /  DBili  x   /  AST  24  /  ALT  36  /  AlkPhos  115        Urinalysis Basic - ( 18 Aug 2022 14:47 )    Color: Yellow / Appearance: Clear / S.005 / pH: x  Gluc: x / Ketone: Negative  / Bili: Negative / Urobili: Negative   Blood: x / Protein: Negative / Nitrite: Negative   Leuk Esterase: Negative / RBC: x / WBC x   Sq Epi: x / Non Sq Epi: x / Bacteria: x        VITAL SIGNS:  T(C): 36.3 (22 @ 05:46), Max: 37.3 (22 @ 12:20)  HR: 74 (22 @ 05:46) (66 - 85)  BP: 113/80 (22 @ 05:46) (93/57 - 120/67)  RR: 18 (22 @ 05:46) (16 - 18)  SpO2: 97% (22 @ 05:46) (95% - 100%)        Physical Exam:    Spine:    General:  No palpable step off. Nontender to palpation. Dressings c/d/i    Motor:                   C5                C6              C7               C8           T1   R            5/5                5/5            5/5             5/5          5/5  L             5/5               5/5             5/5             5/5          5/5                L2             L3             L4               L5            S1  R         4/5           5/5          5/5             5/5           5/5  L          4/5          5/5           5/5             5/5           5/5      Sensory:            C5         C6         C7      C8       T1        (0=absent, 1=impaired, 2=normal, NT=not testable)  R         2            2           2        2         2  L          2            2           2        2         2               L2          L3         L4      L5       S1         (0=absent, 1=impaired, 2=normal, NT=not testable)  R         2            2            2        2        2  L          2            2           2        2         2    UMNs:    Babinski (-) B/L  Clonus (-) B/L  Cota (-) B/L      Imaging: CT AB/Pel Demonstrating fluid collection 7cm retroperotineal    Orthopedic A/P:    Pt is a  66y Male with severe back pain radiating to R leg sp Anterior/ Posterior Fusion of T11-Pelvis at outside hospital     -Concern for retroperotineal bleed, recommend surgical consult / vascular sx  - Pain control PRN  - WBAT  - Hold DVT ppx due to common iliac vein repair during spinal fusion surgery, anemia  - F/U CT abd/p w contrast, CT Lsp to assess possible bleed, hardware placement  - Plan discussed with patient, who is in agreement with the above  - Discussed with Dr. Terry who is aware and approves of plan.  Pt Name: SOUTH MENENDEZ    MRN: [id number]    HPI: Patient is a 66y male with recent surg history of two phase spinal lumbar fusion (, ) c/w common iliac vein repair for which pt had protracted ICU course - for Afib, anemia, new onset MR on TTE w BL LE swelling, UTI, and HSV2.    Patient with Persistent Back pain with RLE radiculopathy. Persistent swelling of BLLE and Scrotum. CT prelim demonstrating fluid collection in retroperitoneal space.    LABS:                        7.4    4.08  )-----------( 369      ( 19 Aug 2022 00:52 )             23.8         140  |  103  |  17  ----------------------------<  103<H>  4.4   |  33<H>  |  1.08    Ca    9.2      18 Aug 2022 11:33    TPro  6.0  /  Alb  2.2<L>  /  TBili  0.3  /  DBili  x   /  AST  24  /  ALT  36  /  AlkPhos  115        Urinalysis Basic - ( 18 Aug 2022 14:47 )    Color: Yellow / Appearance: Clear / S.005 / pH: x  Gluc: x / Ketone: Negative  / Bili: Negative / Urobili: Negative   Blood: x / Protein: Negative / Nitrite: Negative   Leuk Esterase: Negative / RBC: x / WBC x   Sq Epi: x / Non Sq Epi: x / Bacteria: x        VITAL SIGNS:  T(C): 36.3 (22 @ 05:46), Max: 37.3 (22 @ 12:20)  HR: 74 (22 @ 05:46) (66 - 85)  BP: 113/80 (22 @ 05:46) (93/57 - 120/67)  RR: 18 (22 @ 05:46) (16 - 18)  SpO2: 97% (22 @ 05:46) (95% - 100%)        Physical Exam:    Spine:    General:  No palpable step off. Nontender to palpation. Dressings c/d/i    Motor:                   C5                C6              C7               C8           T1   R            5/5                5/5            5/5             5/5          5/5  L             5/5               5/5             5/5             5/5          5/5                L2             L3             L4               L5            S1  R         4/5           5/5          5/5             5/5           5/5  L          4/5          5/5           5/5             5/5           5/5      Sensory:            C5         C6         C7      C8       T1        (0=absent, 1=impaired, 2=normal, NT=not testable)  R         2            2           2        2         2  L          2            2           2        2         2               L2          L3         L4      L5       S1         (0=absent, 1=impaired, 2=normal, NT=not testable)  R         2            2            2        2        2  L          2            2           2        2         2    UMNs:    Babinski (-) B/L  Clonus (-) B/L  Cota (-) B/L      Imaging: CT AB/Pel Demonstrating fluid collection 7cm retroperotineal    Orthopedic A/P:    Pt is a  66y Male with severe back pain radiating to R leg sp Anterior/ Posterior Fusion of T11-Pelvis at outside hospital     - Pain control PRN  - Ambulate as tolerated  - Hold DVT prophylaxis until cause of anemia delineated  - Plan discussed with patient, who is in agreement with the above  - Discussed with Dr. Terry who is aware and approves of plan.

## 2022-08-19 NOTE — PROGRESS NOTE ADULT - SUBJECTIVE AND OBJECTIVE BOX
65 y/o M PMHx significant for Hypertension, Atrial fibrillation on Apixaban, morbid obesity, and spinal stenosis complicated by lumbar radiculopathy who was recently admitted to [facility] (2022 - 2022) s/p elective two stage spine surgery => 2022 Stage 1 lumbar spine fusion anterior approach single level, with Lateral lumbar interbody fusion posterior approach multiple levels, and left globus robot assisted lumbar spine, and on 2022 => Stage 2 Posterior Lumbar Laminectomy Fusion Multiple Level, Iliac Fixation, TLIF L1-L2 (Posterior), Globus Robot Assisted Spine (Posterior). The patient was discharged on 2022 to [facility], [place], operated by [facility] where the patient was found to have worsening back pain with radiation to the right leg, and abdominal pain with distention. As per the patient and patient's daughter at the bedside they stated that the patient was in pain en route to [facility] and while being admitted. In the ED case management was consulted as the patient and family were not satisfied with the care at the facility. All other review of systems negative, except as noted in HPI   has some abdominal discomfort, denies SOB , no scrotal pain, , has AUR s/p straightcath     PHYSICAL EXAM:    Daily     Daily Weight in k.1 (19 Aug 2022 06:39)    ICU Vital Signs Last 24 Hrs  T(C): 36.4 (19 Aug 2022 08:50), Max: 36.7 (18 Aug 2022 19:55)  T(F): 97.6 (19 Aug 2022 08:50), Max: 98 (18 Aug 2022 19:55)  HR: 71 (19 Aug 2022 08:50) (71 - 85)  BP: 107/66 (19 Aug 2022 08:50) (93/57 - 113/80)  BP(mean): 90 (19 Aug 2022 05:46) (69 - 90)  ABP: --  ABP(mean): --  RR: 18 (19 Aug 2022 08:50) (16 - 18)  SpO2: 97% (19 Aug 2022 08:50) (95% - 98%)    O2 Parameters below as of 19 Aug 2022 08:50  Patient On (Oxygen Delivery Method): room air            Constitutional: NAD  HEENT: Atraumatic, SAEED, Normal, No congestion  Respiratory: Breath Sounds normal, no rhonchi/wheeze  Cardiovascular: N S1S2;   Gastrointestinal: Abdomen soft, r, Bowel Ssounds present  Extremities: No edema, peripheral pulses present  Neurological: AAO x 3, no gross focal motor deficits  Skin: Non cellulitic, no rash, ulcers   scrotal edema                             8.4    3.89  )-----------( 364      ( 19 Aug 2022 14:25 )             26.9       CBC Full  -  ( 19 Aug 2022 14:25 )  WBC Count : 3.89 K/uL  RBC Count : 2.91 M/uL  Hemoglobin : 8.4 g/dL  Hematocrit : 26.9 %  Platelet Count - Automated : 364 K/uL  Mean Cell Volume : 92.4 fl  Mean Cell Hemoglobin : 28.9 pg  Mean Cell Hemoglobin Concentration : 31.2 gm/dL  Auto Neutrophil # : x  Auto Lymphocyte # : x  Auto Monocyte # : x  Auto Eosinophil # : x  Auto Basophil # : x  Auto Neutrophil % : x  Auto Lymphocyte % : x  Auto Monocyte % : x  Auto Eosinophil % : x  Auto Basophil % : x          142  |  106  |  13  ----------------------------<  101<H>  4.0   |  32<H>  |  0.89    Ca    9.0      19 Aug 2022 08:08    TPro  5.8<L>  /  Alb  2.1<L>  /  TBili  0.3  /  DBili  x   /  AST  11<L>  /  ALT  32  /  AlkPhos  123<H>        LIVER FUNCTIONS - ( 19 Aug 2022 08:08 )  Alb: 2.1 g/dL / Pro: 5.8 gm/dL / ALK PHOS: 123 U/L / ALT: 32 U/L / AST: 11 U/L / GGT: x                       Urinalysis Basic - ( 18 Aug 2022 14:47 )    Color: Yellow / Appearance: Clear / S.005 / pH: x  Gluc: x / Ketone: Negative  / Bili: Negative / Urobili: Negative   Blood: x / Protein: Negative / Nitrite: Negative   Leuk Esterase: Negative / RBC: x / WBC x   Sq Epi: x / Non Sq Epi: x / Bacteria: x            MEDICATIONS  (STANDING):  acetaminophen     Tablet .. 1000 milliGRAM(s) Oral every 8 hours  aMIOdarone    Tablet 200 milliGRAM(s) Oral daily  gabapentin 300 milliGRAM(s) Oral three times a day  losartan 100 milliGRAM(s) Oral daily  naloxone Injectable 0.4 milliGRAM(s) IV Push once  polyethylene glycol 3350 17 Gram(s) Oral daily  senna 2 Tablet(s) Oral at bedtime

## 2022-08-19 NOTE — CONSULT NOTE ADULT - SUBJECTIVE AND OBJECTIVE BOX
66 year old male with spinal stenosis was recently admitted to [facility] (7/27/2022 - 8/17/2022) s/p elective two stage spine surgery => 7/27/2022 Stage 1 lumbar spine fusion anterior approach single level, with Lateral lumbar interbody fusion posterior approach multiple levels, and left globus robot assisted lumbar spine, and on 7/29/2022 => Stage 2 Posterior Lumbar Laminectomy Fusion Multiple Level, Iliac Fixation, TLIF L1-L2 (Posterior), Globus Robot Assisted Spine (Posterior). The patient was discharged on 8/17/2022 to [facility] where the patient was found to have worsening back pain with radiation to the right leg, and abdominal pain with distention. As per the patient and patient's daughter at the bedside they stated that the patient was in pain en route to [facility] and while being admitted. In the ED case management was consulted as the patient and family were not satisfied with the care at the facility.     General surgery was consulted due to the anterior abdominal  approach and concern for bleeding since preop hb was 10 according to hospitalist. Patient complaining of back pain as well as right lateral thigh pain. He denies numbness or tingling. He is able to move his extremities    ROS negative    PMH: HTN, Afib on Eliquis, [place], spinal stenosis  Allergies: None  Meds as per chart  PSH: as above, abdominoplasty 2003  Sohx: no tobacco, etoh, or illicit drug use    Vitals:  T(C): 36.6 (08-19 @ 02:34), Max: 37.3 (08-18 @ 12:20)  HR: 73 (08-19 @ 02:34) (66 - 85)  BP: 96/63 (08-19 @ 02:34) (93/57 - 120/67)  RR: 16 (08-19 @ 02:34) (16 - 18)  SpO2: 96% (08-19 @ 02:34) (95% - 100%)    08-17 @ 07:01  -  08-18 @ 07:00  --------------------------------------------------------  IN:  Total IN: 0 mL    OUT:    Voided (mL): 475 mL  Total OUT: 475 mL    Total NET: -475 mL      08-18 @ 07:01  -  08-19 @ 02:57  --------------------------------------------------------  IN:  Total IN: 0 mL    OUT:    Voided (mL): 520 mL  Total OUT: 520 mL    Total NET: -520 mL          Physical Exam:  General: AAOx3, Well developed, NAD  Chest: Normal respiratory effort  Heart: RRR, BP on low side of normal  Abdomen: Midline lower abdominal incision healing well, soft, NTND, no masses  Neuro/Psych: No localized deficits. Normal speech, normal tone  Skin: Normal, no rashes, no lesions noted.   Back: LUIS CARLOS dressing clean and  dry, sutures at the inferior aspect, no induration, swelling or erythema  Extremities: Warm, well perfused, bilateral LE 1+ edema, Pulses intact    08-19 @ 00:52                    7.4  CBC: 4.08>)-------(<369                     23.8                 - | - | -    CMP:  ----------------------< -               - | - | -                      Ca:-  Phos:-  Mg:-               -|      |-        LFTs:  ------|-|-----             -|      |- 08-18 @ 21:16                    7.6  CBC: 4.28>)-------(<352                     24.4                 - | - | -    CMP:  ----------------------< -               - | - | -                      Ca:-  Phos:-  Mg:-               -|      |-        LFTs:  ------|-|-----             -|      |-  08-18 @ 11:33                    7.5  CBC: 5.11>)-------(<339                     24.1                 140 | 103 | 17    CMP:  ----------------------< 103               4.4 | 33 | 1.08                      Ca:9.2  Phos:-  Mg:-               0.3|      |24        LFTs:  ------|115|-----             -|      |-      Current Inpatient Medications:  acetaminophen     Tablet .. 1000 milliGRAM(s) Oral every 8 hours  aluminum hydroxide/magnesium hydroxide/simethicone Suspension 30 milliLiter(s) Oral every 4 hours PRN  aMIOdarone    Tablet 200 milliGRAM(s) Oral daily  bisacodyl 5 milliGRAM(s) Oral daily PRN  furosemide   Injectable 20 milliGRAM(s) IV Push once  HYDROmorphone   Tablet 2 milliGRAM(s) Oral every 4 hours PRN  HYDROmorphone  Injectable 0.25 milliGRAM(s) IV Push every 4 hours PRN  HYDROmorphone  Injectable 0.5 milliGRAM(s) IV Push every 4 hours PRN  losartan 100 milliGRAM(s) Oral daily  melatonin 3 milliGRAM(s) Oral at bedtime PRN  naloxone Injectable 0.4 milliGRAM(s) IV Push once  ondansetron Injectable 4 milliGRAM(s) IV Push every 8 hours PRN  polyethylene glycol 3350 17 Gram(s) Oral daily  pregabalin 150 milliGRAM(s) Oral two times a day  senna 2 Tablet(s) Oral at bedtime

## 2022-08-19 NOTE — PHYSICAL THERAPY INITIAL EVALUATION ADULT - GENERAL OBSERVATIONS, REHAB EVAL
Pt encountered supine in bed on [address]. Pt performed ther ex, bed mobility, transfer training, and ambulation. Pt ambulated 25 feet with a rolling walker. Pt returned to bed in no acute distress, call bell within reach. Bed alarm on. Pt mostly speaks Japanese and  speaks limited english.

## 2022-08-19 NOTE — PHYSICAL THERAPY INITIAL EVALUATION ADULT - ADDITIONAL COMMENTS
Pt lives in a private house in [facility]. Pt uses a cane to ambulate the house. Ambulation limited by back pain.

## 2022-08-19 NOTE — PHYSICAL THERAPY INITIAL EVALUATION ADULT - PERTINENT HX OF CURRENT PROBLEM, REHAB EVAL
65 y/o M PMHx significant for Hypertension, Atrial fibrillation on Apixaban, morbid obesity, and spinal stenosis complicated by lumbar radiculopathy who was recently admitted to [facility] (7/27/2022 - 8/17/2022) s/p elective two stage spine surgery. The patient was discharged on 8/17/2022 to [facility] where the patient was found to have worsening back pain with radiation to the right leg, and abdominal pain with distention. As per the patient and patient's daughter at the bedside they stated that the patient was in pain en route to [facility] and while being admitted. In the ED case management was consulted as the patient and family were not satisfied with the care at the facility.

## 2022-08-20 LAB
ANION GAP SERPL CALC-SCNC: 3 MMOL/L — LOW (ref 5–17)
BASOPHILS # BLD AUTO: 0.03 K/UL — SIGNIFICANT CHANGE UP (ref 0–0.2)
BASOPHILS NFR BLD AUTO: 0.7 % — SIGNIFICANT CHANGE UP (ref 0–2)
BUN SERPL-MCNC: 17 MG/DL — SIGNIFICANT CHANGE UP (ref 7–23)
CALCIUM SERPL-MCNC: 8.9 MG/DL — SIGNIFICANT CHANGE UP (ref 8.5–10.1)
CHLORIDE SERPL-SCNC: 108 MMOL/L — SIGNIFICANT CHANGE UP (ref 96–108)
CO2 SERPL-SCNC: 31 MMOL/L — SIGNIFICANT CHANGE UP (ref 22–31)
CREAT SERPL-MCNC: 0.92 MG/DL — SIGNIFICANT CHANGE UP (ref 0.5–1.3)
CULTURE RESULTS: SIGNIFICANT CHANGE UP
EGFR: 92 ML/MIN/1.73M2 — SIGNIFICANT CHANGE UP
EOSINOPHIL # BLD AUTO: 0.24 K/UL — SIGNIFICANT CHANGE UP (ref 0–0.5)
EOSINOPHIL NFR BLD AUTO: 6 % — SIGNIFICANT CHANGE UP (ref 0–6)
GLUCOSE SERPL-MCNC: 89 MG/DL — SIGNIFICANT CHANGE UP (ref 70–99)
HCT VFR BLD CALC: 28.4 % — LOW (ref 39–50)
HGB BLD-MCNC: 8.9 G/DL — LOW (ref 13–17)
IMM GRANULOCYTES NFR BLD AUTO: 0.7 % — SIGNIFICANT CHANGE UP (ref 0–1.5)
LYMPHOCYTES # BLD AUTO: 0.99 K/UL — LOW (ref 1–3.3)
LYMPHOCYTES # BLD AUTO: 24.6 % — SIGNIFICANT CHANGE UP (ref 13–44)
MCHC RBC-ENTMCNC: 29.7 PG — SIGNIFICANT CHANGE UP (ref 27–34)
MCHC RBC-ENTMCNC: 31.3 GM/DL — LOW (ref 32–36)
MCV RBC AUTO: 94.7 FL — SIGNIFICANT CHANGE UP (ref 80–100)
MONOCYTES # BLD AUTO: 0.39 K/UL — SIGNIFICANT CHANGE UP (ref 0–0.9)
MONOCYTES NFR BLD AUTO: 9.7 % — SIGNIFICANT CHANGE UP (ref 2–14)
NEUTROPHILS # BLD AUTO: 2.35 K/UL — SIGNIFICANT CHANGE UP (ref 1.8–7.4)
NEUTROPHILS NFR BLD AUTO: 58.3 % — SIGNIFICANT CHANGE UP (ref 43–77)
PLATELET # BLD AUTO: 414 K/UL — HIGH (ref 150–400)
POTASSIUM SERPL-MCNC: 4 MMOL/L — SIGNIFICANT CHANGE UP (ref 3.5–5.3)
POTASSIUM SERPL-SCNC: 4 MMOL/L — SIGNIFICANT CHANGE UP (ref 3.5–5.3)
RBC # BLD: 3 M/UL — LOW (ref 4.2–5.8)
RBC # FLD: 15.6 % — HIGH (ref 10.3–14.5)
SODIUM SERPL-SCNC: 142 MMOL/L — SIGNIFICANT CHANGE UP (ref 135–145)
SPECIMEN SOURCE: SIGNIFICANT CHANGE UP
WBC # BLD: 4.03 K/UL — SIGNIFICANT CHANGE UP (ref 3.8–10.5)
WBC # FLD AUTO: 4.03 K/UL — SIGNIFICANT CHANGE UP (ref 3.8–10.5)

## 2022-08-20 PROCEDURE — 99232 SBSQ HOSP IP/OBS MODERATE 35: CPT

## 2022-08-20 PROCEDURE — 99222 1ST HOSP IP/OBS MODERATE 55: CPT

## 2022-08-20 RX ADMIN — Medication 1000 MILLIGRAM(S): at 22:43

## 2022-08-20 RX ADMIN — HYDROMORPHONE HYDROCHLORIDE 2 MILLIGRAM(S): 2 INJECTION INTRAMUSCULAR; INTRAVENOUS; SUBCUTANEOUS at 16:15

## 2022-08-20 RX ADMIN — HYDROMORPHONE HYDROCHLORIDE 0.5 MILLIGRAM(S): 2 INJECTION INTRAMUSCULAR; INTRAVENOUS; SUBCUTANEOUS at 07:07

## 2022-08-20 RX ADMIN — LOSARTAN POTASSIUM 100 MILLIGRAM(S): 100 TABLET, FILM COATED ORAL at 08:59

## 2022-08-20 RX ADMIN — AMIODARONE HYDROCHLORIDE 200 MILLIGRAM(S): 400 TABLET ORAL at 08:58

## 2022-08-20 RX ADMIN — Medication 1000 MILLIGRAM(S): at 06:59

## 2022-08-20 RX ADMIN — Medication 1000 MILLIGRAM(S): at 13:02

## 2022-08-20 RX ADMIN — Medication 1000 MILLIGRAM(S): at 23:01

## 2022-08-20 RX ADMIN — HYDROMORPHONE HYDROCHLORIDE 2 MILLIGRAM(S): 2 INJECTION INTRAMUSCULAR; INTRAVENOUS; SUBCUTANEOUS at 21:01

## 2022-08-20 RX ADMIN — HYDROMORPHONE HYDROCHLORIDE 2 MILLIGRAM(S): 2 INJECTION INTRAMUSCULAR; INTRAVENOUS; SUBCUTANEOUS at 17:15

## 2022-08-20 RX ADMIN — HYDROMORPHONE HYDROCHLORIDE 2 MILLIGRAM(S): 2 INJECTION INTRAMUSCULAR; INTRAVENOUS; SUBCUTANEOUS at 03:41

## 2022-08-20 RX ADMIN — SENNA PLUS 2 TABLET(S): 8.6 TABLET ORAL at 22:43

## 2022-08-20 RX ADMIN — GABAPENTIN 300 MILLIGRAM(S): 400 CAPSULE ORAL at 13:02

## 2022-08-20 RX ADMIN — POLYETHYLENE GLYCOL 3350 17 GRAM(S): 17 POWDER, FOR SOLUTION ORAL at 08:59

## 2022-08-20 RX ADMIN — HYDROMORPHONE HYDROCHLORIDE 2 MILLIGRAM(S): 2 INJECTION INTRAMUSCULAR; INTRAVENOUS; SUBCUTANEOUS at 20:34

## 2022-08-20 RX ADMIN — HYDROMORPHONE HYDROCHLORIDE 2 MILLIGRAM(S): 2 INJECTION INTRAMUSCULAR; INTRAVENOUS; SUBCUTANEOUS at 04:21

## 2022-08-20 RX ADMIN — Medication 1000 MILLIGRAM(S): at 14:02

## 2022-08-20 RX ADMIN — GABAPENTIN 300 MILLIGRAM(S): 400 CAPSULE ORAL at 22:43

## 2022-08-20 RX ADMIN — GABAPENTIN 300 MILLIGRAM(S): 400 CAPSULE ORAL at 06:59

## 2022-08-20 NOTE — CONSULT NOTE ADULT - SUBJECTIVE AND OBJECTIVE BOX
CHIEF COMPLAINT:    HISTORY OF PRESENT ILLNESS:    PAST MEDICAL & SURGICAL HISTORY:  HTN (hypertension)      Chronic atrial fibrillation      Status post lumbar and lumbosacral fusion by anterior technique      Status post lumbar spine operation  7/27/2022 Stage 1 lumbar spine fusion anterior approach single level, Lateral lumbar interbody fusion posterior approach multiple levels, and left globus robot assisted lumbar spine     7/29/2022 Stage 2 Posterior Lumbar Laminectomy Fusion Multiple Level, Iliac Fixation, TLIF L1-L2 (Posterior), Globus Robot Assisted Spine (Posterior)          REVIEW OF SYSTEMS:    CONSTITUTIONAL: No weakness, fevers or chills  EYES/ENT: No visual changes;  No vertigo or throat pain   NECK: No pain or stiffness  RESPIRATORY: No cough, wheezing, hemoptysis, No shortness of breath  CARDIOVASCULAR: No chest pain or palpitations  GASTROINTESTINAL: No abdominal or flank pain, No nausea, vomiting, diarrhea or constipation  GENITOURINARY: See HPI  NEUROLOGICAL: No numbness or weakness  SKIN: No rashes or lesions   All other review of systems is negative unless indicated above.    MEDICATIONS  (STANDING):  acetaminophen     Tablet .. 1000 milliGRAM(s) Oral every 8 hours  aMIOdarone    Tablet 200 milliGRAM(s) Oral daily  gabapentin 300 milliGRAM(s) Oral three times a day  losartan 100 milliGRAM(s) Oral daily  naloxone Injectable 0.4 milliGRAM(s) IV Push once  polyethylene glycol 3350 17 Gram(s) Oral daily  senna 2 Tablet(s) Oral at bedtime    MEDICATIONS  (PRN):  aluminum hydroxide/magnesium hydroxide/simethicone Suspension 30 milliLiter(s) Oral every 4 hours PRN Dyspepsia  bisacodyl 5 milliGRAM(s) Oral daily PRN Constipation  HYDROmorphone   Tablet 2 milliGRAM(s) Oral every 4 hours PRN Severe Pain (7 - 10)  HYDROmorphone  Injectable 0.25 milliGRAM(s) IV Push every 4 hours PRN Moderate Pain (4 - 6)  HYDROmorphone  Injectable 0.5 milliGRAM(s) IV Push every 4 hours PRN Severe Pain (7 - 10)  melatonin 3 milliGRAM(s) Oral at bedtime PRN Insomnia  ondansetron Injectable 4 milliGRAM(s) IV Push every 8 hours PRN Nausea and/or Vomiting      Allergies    No Known Allergies    Intolerances        SOCIAL HISTORY:    FAMILY HISTORY:  No pertinent family history in first degree relatives        Vital Signs Last 24 Hrs  T(C): 36.6 (20 Aug 2022 15:30), Max: 36.6 (20 Aug 2022 08:21)  T(F): 97.9 (20 Aug 2022 15:30), Max: 97.9 (20 Aug 2022 08:21)  HR: 73 (20 Aug 2022 15:30) (71 - 96)  BP: 104/68 (20 Aug 2022 15:30) (97/66 - 122/93)  BP(mean): 87 (20 Aug 2022 06:58) (76 - 87)  RR: 18 (20 Aug 2022 15:30) (18 - 18)  SpO2: 98% (20 Aug 2022 15:30) (95% - 98%)    Parameters below as of 20 Aug 2022 15:30  Patient On (Oxygen Delivery Method): room air        PHYSICAL EXAM:    Constitutional: No acute distress  HEENT: EOMI, Normal Hearing  Neck: Supple  Back: No costovertebral angle tenderness  Respiratory: Normal respiratory effort    Cardiovascular: Normal peripheral circulation   Abd: Soft, non distended, non tender  : Normal urethra, ***circumcised penis, bilateral normal to palpate  ARMANDO: Prostate- *** gms, non tender, no nodules  Extremities: No peripheral edema  Neurological: No focal deficits  Psychiatric: Normal mood, normal affect  Musculoskeletal: Moving all 4 extremities  Skin: No rashes    LABS:                        8.9    4.03  )-----------( 414      ( 20 Aug 2022 07:41 )             28.4     08-20    142  |  108  |  17  ----------------------------<  89  4.0   |  31  |  0.92    Ca    8.9      20 Aug 2022 07:41    TPro  5.8<L>  /  Alb  2.1<L>  /  TBili  0.3  /  DBili  x   /  AST  11<L>  /  ALT  32  /  AlkPhos  123<H>  08-19        Urine Culture:     RADIOLOGY & ADDITIONAL STUDIES: CHIEF COMPLAINT:  Scrotal edema     HISTORY OF PRESENT ILLNESS:  65 y/o male presented with worsening back pain with radiation to the right leg, and abdominal pain with distention. Noted to scrotal swelling, on scrotal ultrasound; Scrotal edema.     Had Spinal stenosis complicated by lumbar radiculopathy who was recently admitted to [facility] (7/27/2022 - 8/17/2022) s/p elective two stage spine surgery => 7/27/2022 Stage 1 lumbar spine fusion anterior approach single level, with Lateral lumbar interbody fusion posterior approach multiple levels, and left globus robot assisted lumbar spine, and on 7/29/2022 => Stage 2 Posterior Lumbar Laminectomy Fusion Multiple Level, Iliac Fixation, TLIF L1-L2 (Posterior), Globus Robot Assisted Spine (Posterior).     PAST MEDICAL & SURGICAL HISTORY:  HTN (hypertension)      Chronic atrial fibrillation      Status post lumbar and lumbosacral fusion by anterior technique      Status post lumbar spine operation  7/27/2022 Stage 1 lumbar spine fusion anterior approach single level, Lateral lumbar interbody fusion posterior approach multiple levels, and left globus robot assisted lumbar spine     7/29/2022 Stage 2 Posterior Lumbar Laminectomy Fusion Multiple Level, Iliac Fixation, TLIF L1-L2 (Posterior), Globus Robot Assisted Spine (Posterior)          REVIEW OF SYSTEMS:  All other review of systems is negative unless indicated above.    MEDICATIONS  (STANDING):  acetaminophen     Tablet .. 1000 milliGRAM(s) Oral every 8 hours  aMIOdarone    Tablet 200 milliGRAM(s) Oral daily  gabapentin 300 milliGRAM(s) Oral three times a day  losartan 100 milliGRAM(s) Oral daily  naloxone Injectable 0.4 milliGRAM(s) IV Push once  polyethylene glycol 3350 17 Gram(s) Oral daily  senna 2 Tablet(s) Oral at bedtime    MEDICATIONS  (PRN):  aluminum hydroxide/magnesium hydroxide/simethicone Suspension 30 milliLiter(s) Oral every 4 hours PRN Dyspepsia  bisacodyl 5 milliGRAM(s) Oral daily PRN Constipation  HYDROmorphone   Tablet 2 milliGRAM(s) Oral every 4 hours PRN Severe Pain (7 - 10)  HYDROmorphone  Injectable 0.25 milliGRAM(s) IV Push every 4 hours PRN Moderate Pain (4 - 6)  HYDROmorphone  Injectable 0.5 milliGRAM(s) IV Push every 4 hours PRN Severe Pain (7 - 10)  melatonin 3 milliGRAM(s) Oral at bedtime PRN Insomnia  ondansetron Injectable 4 milliGRAM(s) IV Push every 8 hours PRN Nausea and/or Vomiting      Allergies    No Known Allergies    Intolerances        SOCIAL HISTORY:    FAMILY HISTORY:  No pertinent family history in first degree relatives        Vital Signs Last 24 Hrs  T(C): 36.6 (20 Aug 2022 15:30), Max: 36.6 (20 Aug 2022 08:21)  T(F): 97.9 (20 Aug 2022 15:30), Max: 97.9 (20 Aug 2022 08:21)  HR: 73 (20 Aug 2022 15:30) (71 - 96)  BP: 104/68 (20 Aug 2022 15:30) (97/66 - 122/93)  BP(mean): 87 (20 Aug 2022 06:58) (76 - 87)  RR: 18 (20 Aug 2022 15:30) (18 - 18)  SpO2: 98% (20 Aug 2022 15:30) (95% - 98%)    Parameters below as of 20 Aug 2022 15:30  Patient On (Oxygen Delivery Method): room air        PHYSICAL EXAM:    Constitutional: No acute distress  HEENT: EOMI, Normal Hearing  Neck: Supple  Back: No costovertebral angle tenderness  Respiratory: Normal respiratory effort    Cardiovascular: Normal peripheral circulation   Abd: Soft, Incision: C, D, I   : Scrotal and penile edema   Extremities: No peripheral edema  Neurological: No focal deficits  Psychiatric: Normal mood, normal affect  Musculoskeletal: Moving all 4 extremities  Skin: No rashes    LABS:                        8.9    4.03  )-----------( 414      ( 20 Aug 2022 07:41 )             28.4     08-20    142  |  108  |  17  ----------------------------<  89  4.0   |  31  |  0.92    Ca    8.9      20 Aug 2022 07:41    TPro  5.8<L>  /  Alb  2.1<L>  /  TBili  0.3  /  DBili  x   /  AST  11<L>  /  ALT  32  /  AlkPhos  123<H>  08-19      < from: US Testicles (08.19.22 @ 18:47) >    ACC: [id number] EXAM:  US SCROTUM AND CONTENTS                          PROCEDURE DATE:  08/19/2022          INTERPRETATION:  CLINICAL INFORMATION: Scrotal edema    COMPARISON: None available.    TECHNIQUE: Testicular ultrasound utilizing color and spectral Doppler.    FINDINGS:  Very severe bilateral scrotal edema    RIGHT:  Right testis: 4.0 cm x 2.8 cm x  2.6 cm. Normal echogenicity and   echotexture with no masses or areas of architectural distortion. Normal   arterial and venous blood flow pattern.  Right epididymis: Within normal limits.  Right hydrocele: Small  Right varicocele: No obvious varicocele    LEFT:  Left testis: 3.9 cm x 2.9 cm x 2.9 cm. Normal echogenicity and   echotexture with no masses or areas of architectural distortion. Normal   arterial and venous blood flow pattern.  Left epididymis: Within normal limits.  Left hydrocele: Small  Left varicocele: No obvious varicocele    IMPRESSION:    No intrinsic testicular abnormality.  Small bilateral simple hydroceles.  Severe scrotal edema    < end of copied text >

## 2022-08-20 NOTE — PROGRESS NOTE ADULT - NSPROGADDITIONALINFOA_GEN_ALL_CORE
Above note reviewed and edited.    No planned orthopedic spine intervention. Appreciate medical management of anemia and recommendations.    Rey Terry,   Orthopedic and Spine Surgeon  [facility]  [email or web address]

## 2022-08-20 NOTE — CONSULT NOTE ADULT - ASSESSMENT
Scrotal elevation and support  Scrotal edema secondary to fluid shift and being bed ridden.   Recommend scrotal elevation and support.

## 2022-08-20 NOTE — PROGRESS NOTE ADULT - SUBJECTIVE AND OBJECTIVE BOX
Patient well-appearing and in no acute distress; no acute events overnight.     Vital Signs Last 24 Hrs  T(C): 36.6 (20 Aug 2022 08:21), Max: 36.6 (20 Aug 2022 08:21)  T(F): 97.9 (20 Aug 2022 08:21), Max: 97.9 (20 Aug 2022 08:21)  HR: 96 (20 Aug 2022 08:21) (71 - 96)  BP: 122/93 (20 Aug 2022 08:21) (97/66 - 128/65)  BP(mean): 87 (20 Aug 2022 06:58) (76 - 87)  ABP: --  ABP(mean): --  RR: 18 (20 Aug 2022 08:21) (18 - 18)  SpO2: 95% (20 Aug 2022 08:21) (95% - 98%)        LABS:                        8.9    4.03  )-----------( 414      ( 20 Aug 2022 07:41 )             28.4     08-20    142  |  108  |  17  ----------------------------<  89  4.0   |  31  |  0.92    Ca    8.9      20 Aug 2022 07:41    TPro  5.8<L>  /  Alb  2.1<L>  /  TBili  0.3  /  DBili  x   /  AST  11<L>  /  ALT  32  /  AlkPhos  123<H>  08-19        Physical Exam:    Spine:    General:  No palpable step off. Nontender to palpation. Dressings c/d/i    Motor:                   C5                C6              C7               C8           T1   R            5/5                5/5            5/5             5/5          5/5  L             5/5               5/5             5/5             5/5          5/5                L2             L3             L4               L5            S1  R         4/5           5/5          5/5             5/5           5/5  L          4/5          5/5           5/5             5/5           5/5      Sensory:            C5         C6         C7      C8       T1        (0=absent, 1=impaired, 2=normal, NT=not testable)  R         2            2           2        2         2  L          2            2           2        2         2               L2          L3         L4      L5       S1         (0=absent, 1=impaired, 2=normal, NT=not testable)  R         2            2            2        2        2  L          2            2           2        2         2    UMNs:    Babinski (-) B/L  Clonus (-) B/L  Cota (-) B/L      Imaging:   CT AB/Pel Demonstrating fluid collection 7cm retroperotineal  MR T Sp: T11 through S1 with transpedicular screws and connecting rods and postoperative fluid collection which may represent a seroma or contained leak; Limited due to signal dropout from metallic fixation; No obvious abnormal enhancement.    Orthopedic A/P:    Pt is a  66y Male with severe back pain radiating to R leg sp Anterior/ Posterior Fusion of T11-Pelvis at outside hospital     - Pain control PRN  - Ambulate as tolerated  - Hold DVT prophylaxis until cause of anemia delineated  - Plan discussed with patient, who is in agreement with the above  - Will discuss with Dr. Terry and advise of any changes to the above plan.   Patient well-appearing and in no acute distress; no acute events overnight.     Vital Signs Last 24 Hrs  T(C): 36.6 (20 Aug 2022 08:21), Max: 36.6 (20 Aug 2022 08:21)  T(F): 97.9 (20 Aug 2022 08:21), Max: 97.9 (20 Aug 2022 08:21)  HR: 96 (20 Aug 2022 08:21) (71 - 96)  BP: 122/93 (20 Aug 2022 08:21) (97/66 - 128/65)  BP(mean): 87 (20 Aug 2022 06:58) (76 - 87)  ABP: --  ABP(mean): --  RR: 18 (20 Aug 2022 08:21) (18 - 18)  SpO2: 95% (20 Aug 2022 08:21) (95% - 98%)        LABS:                        8.9    4.03  )-----------( 414      ( 20 Aug 2022 07:41 )             28.4     08-20    142  |  108  |  17  ----------------------------<  89  4.0   |  31  |  0.92    Ca    8.9      20 Aug 2022 07:41    TPro  5.8<L>  /  Alb  2.1<L>  /  TBili  0.3  /  DBili  x   /  AST  11<L>  /  ALT  32  /  AlkPhos  123<H>  08-19        Physical Exam:    Spine:    General:  No palpable step off. Nontender to palpation. Dressings c/d/i    Motor:                   C5                C6              C7               C8           T1   R            5/5                5/5            5/5             5/5          5/5  L             5/5               5/5             5/5             5/5          5/5                L2             L3             L4               L5            S1  R         4/5           5/5          5/5             5/5           5/5  L          4/5          5/5           5/5             5/5           5/5      Sensory:            C5         C6         C7      C8       T1        (0=absent, 1=impaired, 2=normal, NT=not testable)  R         2            2           2        2         2  L          2            2           2        2         2               L2          L3         L4      L5       S1         (0=absent, 1=impaired, 2=normal, NT=not testable)  R         2            2            2        2        2  L          2            2           2        2         2    UMNs:    Babinski (-) B/L  Clonus (-) B/L  Cota (-) B/L      Imaging:   CT AB/Pel Demonstrating fluid collection 7cm retroperotineal  MR T Sp: T11 through S1 with transpedicular screws and connecting rods and postoperative fluid collection which may represent a seroma or contained leak; Limited due to signal dropout from metallic fixation; No obvious abnormal enhancement.    Orthopedic A/P:    Pt is a  66y Male with severe back pain radiating to R leg sp Anterior/ Posterior Fusion of T11-Pelvis at outside hospital     - Pain control PRN  - Ambulate as tolerated  - Plan discussed with patient, who is in agreement with the above  - Will discuss with Dr. Terry and advise of any changes to the above plan.

## 2022-08-20 NOTE — PROGRESS NOTE ADULT - SUBJECTIVE AND OBJECTIVE BOX
Patient seen and examined at bedside this am.  Complains of right sided back pain radiated down right leg.  Patient transfused 1u pRBC yesterday.  Denies fever, chills, chest pain, sob, abdominal pain, n/v/d. Endorses passing flatus and having BM.  Tolerating diet.     Vital Signs (24 Hrs):  T(C): 36.6 (22 @ 08:21), Max: 36.6 (22 @ 08:21)  HR: 96 (22 @ 08:21) (71 - 96)  BP: 122/93 (22 @ 08:21) (97/66 - 128/65)  RR: 18 (22 @ 08:21) (18 - 18)  SpO2: 95% (22 @ 08:21) (95% - 98%)  Wt(kg): --  Daily     Daily     I&O's Summary    19 Aug 2022 07:01  -  20 Aug 2022 07:00  --------------------------------------------------------  IN: 0 mL / OUT: 1500 mL / NET: -1500 mL        PMH: HTN, Afib on Eliquis, MO, spinal stenosis  Allergies: None  Meds as per chart  PSH: as above, abdominoplasty   Sohx: no tobacco, etoh, or illicit drug use    .  LABS:                         8.9    4.03  )-----------( 414      ( 20 Aug 2022 07:41 )             28.4         142  |  108  |  17  ----------------------------<  89  4.0   |  31  |  0.92    Ca    8.9      20 Aug 2022 07:41    TPro  5.8<L>  /  Alb  2.1<L>  /  TBili  0.3  /  DBili  x   /  AST  11<L>  /  ALT  32  /  AlkPhos  123<H>  08      Urinalysis Basic - ( 18 Aug 2022 14:47 )    Color: Yellow / Appearance: Clear / S.005 / pH: x  Gluc: x / Ketone: Negative  / Bili: Negative / Urobili: Negative   Blood: x / Protein: Negative / Nitrite: Negative   Leuk Esterase: Negative / RBC: x / WBC x   Sq Epi: x / Non Sq Epi: x / Bacteria: x            RADIOLOGY, EKG & ADDITIONAL TESTS: Reviewed.

## 2022-08-20 NOTE — PROGRESS NOTE ADULT - SUBJECTIVE AND OBJECTIVE BOX
65 y/o M PMHx significant for Hypertension, Atrial fibrillation on Apixaban, morbid obesity, and spinal stenosis complicated by lumbar radiculopathy who was recently admitted to [facility] (7/27/2022 - 8/17/2022) s/p elective two stage spine surgery => 7/27/2022 Stage 1 lumbar spine fusion anterior approach single level, with Lateral lumbar interbody fusion posterior approach multiple levels, and left globus robot assisted lumbar spine, and on 7/29/2022 => Stage 2 Posterior Lumbar Laminectomy Fusion Multiple Level, Iliac Fixation, TLIF L1-L2 (Posterior), Globus Robot Assisted Spine (Posterior). The patient was discharged on 8/17/2022 to [facility] where the patient was found to have worsening back pain with radiation to the right leg, and abdominal pain with distention. As per the patient and patient's daughter at the bedside they stated that the patient was in pain [facility] [place] and while being admitted. In the ED case management was consulted as the patient and family were not satisfied with the care at the facility. All other review of systems negative, except as noted in HPI  8/19 has some abdominal discomfort, denies SOB , no scrotal pain, , has AUR s/p straightcath  8/20 no abd pain, has scrotal edema without pain   physical exam    Constitutional: NAD  HEENT: Atraumatic, SAEED, Normal, No congestion  Respiratory: Breath Sounds normal, no rhonchi/wheeze  Cardiovascular: N S1S2;   Gastrointestinal: Abdomen soft, r, Bowel Ssounds present  Extremities: No edema, peripheral pulses present  Neurological: AAO x 3, no gross focal motor deficits  Skin: Non cellulitic, no rash, ulcers   scrotal edema       PHYSICAL EXAM:    Daily     Daily     ICU Vital Signs Last 24 Hrs  T(C): 36.6 (20 Aug 2022 15:30), Max: 36.6 (20 Aug 2022 08:21)  T(F): 97.9 (20 Aug 2022 15:30), Max: 97.9 (20 Aug 2022 08:21)  HR: 73 (20 Aug 2022 15:30) (71 - 96)  BP: 104/68 (20 Aug 2022 15:30) (97/66 - 128/65)  BP(mean): 87 (20 Aug 2022 06:58) (76 - 87)  ABP: --  ABP(mean): --  RR: 18 (20 Aug 2022 15:30) (18 - 18)  SpO2: 98% (20 Aug 2022 15:30) (95% - 98%)    O2 Parameters below as of 20 Aug 2022 15:30  Patient On (Oxygen Delivery Method): room air                            8.9    4.03  )-----------( 414      ( 20 Aug 2022 07:41 )             28.4       CBC Full  -  ( 20 Aug 2022 07:41 )  WBC Count : 4.03 K/uL  RBC Count : 3.00 M/uL  Hemoglobin : 8.9 g/dL  Hematocrit : 28.4 %  Platelet Count - Automated : 414 K/uL  Mean Cell Volume : 94.7 fl  Mean Cell Hemoglobin : 29.7 pg  Mean Cell Hemoglobin Concentration : 31.3 gm/dL  Auto Neutrophil # : 2.35 K/uL  Auto Lymphocyte # : 0.99 K/uL  Auto Monocyte # : 0.39 K/uL  Auto Eosinophil # : 0.24 K/uL  Auto Basophil # : 0.03 K/uL  Auto Neutrophil % : 58.3 %  Auto Lymphocyte % : 24.6 %  Auto Monocyte % : 9.7 %  Auto Eosinophil % : 6.0 %  Auto Basophil % : 0.7 %      08-20    142  |  108  |  17  ----------------------------<  89  4.0   |  31  |  0.92    Ca    8.9      20 Aug 2022 07:41    TPro  5.8<L>  /  Alb  2.1<L>  /  TBili  0.3  /  DBili  x   /  AST  11<L>  /  ALT  32  /  AlkPhos  123<H>  08-19      LIVER FUNCTIONS - ( 19 Aug 2022 08:08 )  Alb: 2.1 g/dL / Pro: 5.8 gm/dL / ALK PHOS: 123 U/L / ALT: 32 U/L / AST: 11 U/L / GGT: x                               MEDICATIONS  (STANDING):  acetaminophen     Tablet .. 1000 milliGRAM(s) Oral every 8 hours  aMIOdarone    Tablet 200 milliGRAM(s) Oral daily  gabapentin 300 milliGRAM(s) Oral three times a day  losartan 100 milliGRAM(s) Oral daily  naloxone Injectable 0.4 milliGRAM(s) IV Push once  polyethylene glycol 3350 17 Gram(s) Oral daily  senna 2 Tablet(s) Oral at bedtime

## 2022-08-20 NOTE — PROGRESS NOTE ADULT - ATTENDING COMMENTS
Patient resting comfortable in bed with c/o right flank/back pain that radiates to RLE, that has improvement with pain medication. From Gen-Sx standpoint stable.         Plan  - Care per primary team  - Signing off re-consult if needed
Above note reviewed.    Please refer to consultation note for complete addendum.    No anticipated orthopedic spine surgical intervention.    Rey Terry,   Orthopedic and Spine Surgeon  [facility]  [email or web address]

## 2022-08-21 DIAGNOSIS — N50.89 OTHER SPECIFIED DISORDERS OF THE MALE GENITAL ORGANS: ICD-10-CM

## 2022-08-21 LAB
ANION GAP SERPL CALC-SCNC: 4 MMOL/L — LOW (ref 5–17)
BASOPHILS # BLD AUTO: 0.02 K/UL — SIGNIFICANT CHANGE UP (ref 0–0.2)
BASOPHILS NFR BLD AUTO: 0.6 % — SIGNIFICANT CHANGE UP (ref 0–2)
BUN SERPL-MCNC: 14 MG/DL — SIGNIFICANT CHANGE UP (ref 7–23)
CALCIUM SERPL-MCNC: 8.9 MG/DL — SIGNIFICANT CHANGE UP (ref 8.5–10.1)
CHLORIDE SERPL-SCNC: 109 MMOL/L — HIGH (ref 96–108)
CO2 SERPL-SCNC: 29 MMOL/L — SIGNIFICANT CHANGE UP (ref 22–31)
CREAT SERPL-MCNC: 0.89 MG/DL — SIGNIFICANT CHANGE UP (ref 0.5–1.3)
CULTURE RESULTS: NO GROWTH — SIGNIFICANT CHANGE UP
EGFR: 95 ML/MIN/1.73M2 — SIGNIFICANT CHANGE UP
EOSINOPHIL # BLD AUTO: 0.23 K/UL — SIGNIFICANT CHANGE UP (ref 0–0.5)
EOSINOPHIL NFR BLD AUTO: 6.4 % — HIGH (ref 0–6)
GLUCOSE SERPL-MCNC: 99 MG/DL — SIGNIFICANT CHANGE UP (ref 70–99)
HCT VFR BLD CALC: 27.8 % — LOW (ref 39–50)
HCT VFR BLD CALC: 30.8 % — LOW (ref 39–50)
HGB BLD-MCNC: 8.4 G/DL — LOW (ref 13–17)
HGB BLD-MCNC: 9.4 G/DL — LOW (ref 13–17)
IMM GRANULOCYTES NFR BLD AUTO: 1.4 % — SIGNIFICANT CHANGE UP (ref 0–1.5)
LYMPHOCYTES # BLD AUTO: 0.91 K/UL — LOW (ref 1–3.3)
LYMPHOCYTES # BLD AUTO: 25.3 % — SIGNIFICANT CHANGE UP (ref 13–44)
MCHC RBC-ENTMCNC: 28.3 PG — SIGNIFICANT CHANGE UP (ref 27–34)
MCHC RBC-ENTMCNC: 30.2 GM/DL — LOW (ref 32–36)
MCV RBC AUTO: 93.6 FL — SIGNIFICANT CHANGE UP (ref 80–100)
MONOCYTES # BLD AUTO: 0.39 K/UL — SIGNIFICANT CHANGE UP (ref 0–0.9)
MONOCYTES NFR BLD AUTO: 10.9 % — SIGNIFICANT CHANGE UP (ref 2–14)
NEUTROPHILS # BLD AUTO: 1.99 K/UL — SIGNIFICANT CHANGE UP (ref 1.8–7.4)
NEUTROPHILS NFR BLD AUTO: 55.4 % — SIGNIFICANT CHANGE UP (ref 43–77)
PLATELET # BLD AUTO: 423 K/UL — HIGH (ref 150–400)
POTASSIUM SERPL-MCNC: 3.8 MMOL/L — SIGNIFICANT CHANGE UP (ref 3.5–5.3)
POTASSIUM SERPL-SCNC: 3.8 MMOL/L — SIGNIFICANT CHANGE UP (ref 3.5–5.3)
RBC # BLD: 2.97 M/UL — LOW (ref 4.2–5.8)
RBC # FLD: 15.7 % — HIGH (ref 10.3–14.5)
SODIUM SERPL-SCNC: 142 MMOL/L — SIGNIFICANT CHANGE UP (ref 135–145)
WBC # BLD: 3.59 K/UL — LOW (ref 3.8–10.5)
WBC # FLD AUTO: 3.59 K/UL — LOW (ref 3.8–10.5)

## 2022-08-21 PROCEDURE — 99232 SBSQ HOSP IP/OBS MODERATE 35: CPT

## 2022-08-21 RX ADMIN — Medication 1000 MILLIGRAM(S): at 05:44

## 2022-08-21 RX ADMIN — HYDROMORPHONE HYDROCHLORIDE 2 MILLIGRAM(S): 2 INJECTION INTRAMUSCULAR; INTRAVENOUS; SUBCUTANEOUS at 12:48

## 2022-08-21 RX ADMIN — HYDROMORPHONE HYDROCHLORIDE 2 MILLIGRAM(S): 2 INJECTION INTRAMUSCULAR; INTRAVENOUS; SUBCUTANEOUS at 18:48

## 2022-08-21 RX ADMIN — HYDROMORPHONE HYDROCHLORIDE 2 MILLIGRAM(S): 2 INJECTION INTRAMUSCULAR; INTRAVENOUS; SUBCUTANEOUS at 08:40

## 2022-08-21 RX ADMIN — POLYETHYLENE GLYCOL 3350 17 GRAM(S): 17 POWDER, FOR SOLUTION ORAL at 09:17

## 2022-08-21 RX ADMIN — HYDROMORPHONE HYDROCHLORIDE 2 MILLIGRAM(S): 2 INJECTION INTRAMUSCULAR; INTRAVENOUS; SUBCUTANEOUS at 23:20

## 2022-08-21 RX ADMIN — SENNA PLUS 2 TABLET(S): 8.6 TABLET ORAL at 21:18

## 2022-08-21 RX ADMIN — GABAPENTIN 300 MILLIGRAM(S): 400 CAPSULE ORAL at 14:35

## 2022-08-21 RX ADMIN — HYDROMORPHONE HYDROCHLORIDE 2 MILLIGRAM(S): 2 INJECTION INTRAMUSCULAR; INTRAVENOUS; SUBCUTANEOUS at 03:14

## 2022-08-21 RX ADMIN — GABAPENTIN 300 MILLIGRAM(S): 400 CAPSULE ORAL at 05:44

## 2022-08-21 RX ADMIN — Medication 1000 MILLIGRAM(S): at 21:38

## 2022-08-21 RX ADMIN — HYDROMORPHONE HYDROCHLORIDE 2 MILLIGRAM(S): 2 INJECTION INTRAMUSCULAR; INTRAVENOUS; SUBCUTANEOUS at 07:40

## 2022-08-21 RX ADMIN — HYDROMORPHONE HYDROCHLORIDE 2 MILLIGRAM(S): 2 INJECTION INTRAMUSCULAR; INTRAVENOUS; SUBCUTANEOUS at 11:48

## 2022-08-21 RX ADMIN — Medication 1000 MILLIGRAM(S): at 15:35

## 2022-08-21 RX ADMIN — GABAPENTIN 300 MILLIGRAM(S): 400 CAPSULE ORAL at 21:18

## 2022-08-21 RX ADMIN — Medication 1000 MILLIGRAM(S): at 14:35

## 2022-08-21 RX ADMIN — LOSARTAN POTASSIUM 100 MILLIGRAM(S): 100 TABLET, FILM COATED ORAL at 09:18

## 2022-08-21 RX ADMIN — HYDROMORPHONE HYDROCHLORIDE 2 MILLIGRAM(S): 2 INJECTION INTRAMUSCULAR; INTRAVENOUS; SUBCUTANEOUS at 22:59

## 2022-08-21 RX ADMIN — Medication 1000 MILLIGRAM(S): at 21:18

## 2022-08-21 RX ADMIN — AMIODARONE HYDROCHLORIDE 200 MILLIGRAM(S): 400 TABLET ORAL at 09:17

## 2022-08-21 NOTE — PROGRESS NOTE ADULT - SUBJECTIVE AND OBJECTIVE BOX
67 y/o M PMHx significant for Hypertension, Atrial fibrillation on Apixaban, morbid obesity, and spinal stenosis complicated by lumbar radiculopathy who was recently admitted to [facility] (7/27/2022 - 8/17/2022) s/p elective two stage spine surgery => 7/27/2022 Stage 1 lumbar spine fusion anterior approach single level, with Lateral lumbar interbody fusion posterior approach multiple levels, and left globus robot assisted lumbar spine, and on 7/29/2022 => Stage 2 Posterior Lumbar Laminectomy Fusion Multiple Level, Iliac Fixation, TLIF L1-L2 (Posterior), Globus Robot Assisted Spine (Posterior). The patient was discharged on 8/17/2022 to [facility] where the patient was found to have worsening back pain with radiation to the right leg, and abdominal pain with distention. As per the patient and patient's daughter at the bedside they stated that the patient was in pain en route to [facility] and while being admitted. In the ED case management was consulted as the patient and family were not satisfied with the care at the facility. All other review of systems negative, except as noted in HPI  8/19 has some abdominal discomfort, denies SOB , no scrotal pain, , has AUR s/p straightcath  8/20 no abd pain, has scrotal edema without pain   8/21 still with R thigh radiating pain with numbness  physical exam    Constitutional: NAD  HEENT: Atraumatic, SAEED, Normal, No congestion  Respiratory: Breath Sounds normal, no rhonchi/wheeze  Cardiovascular: N S1S2;   Gastrointestinal: Abdomen soft, r, Bowel Ssounds present  Extremities: No edema, peripheral pulses present  Neurological: AAO x 3, no gross focal motor deficits  Skin: Non cellulitic, no rash, ulcers   scrotal edema       PHYSICAL EXAM:    Daily     Daily     ICU Vital Signs Last 24 Hrs  T(C): 36.6 (21 Aug 2022 09:01), Max: 36.6 (20 Aug 2022 21:00)  T(F): 97.8 (21 Aug 2022 09:01), Max: 97.8 (20 Aug 2022 21:00)  HR: 69 (21 Aug 2022 09:01) (69 - 76)  BP: 117/76 (21 Aug 2022 09:01) (108/67 - 128/81)  BP(mean): --  ABP: --  ABP(mean): --  RR: 18 (21 Aug 2022 09:01) (18 - 20)  SpO2: 99% (21 Aug 2022 09:01) (99% - 100%)    O2 Parameters below as of 21 Aug 2022 09:01  Patient On (Oxygen Delivery Method): room air            Constitutional: Well appearing  HEENT: Atraumatic, SAEED, Normal, No congestion  Respiratory: Breath Sounds normal, no rhonchi/wheeze  Cardiovascular: N S1S2; CINDY present  Gastrointestinal: Abdomen soft, non tender, Bowel Ssounds present  Extremities: No edema, peripheral pulses present  Neurological: AAO x 3, no gross focal motor deficits  Skin: Non cellulitic, no rash, ulcers  Lymph Nodes: No lymphadenopathy noted  Back: No CVA tenderness   Musculoskeletal: non tender  Breasts: Deferred  Genitourinary: deferred  Rectal: Deferred                          9.4    x     )-----------( x        ( 21 Aug 2022 11:43 )             30.8       CBC Full  -  ( 21 Aug 2022 11:43 )  WBC Count : x  RBC Count : x  Hemoglobin : 9.4 g/dL  Hematocrit : 30.8 %  Platelet Count - Automated : x  Mean Cell Volume : x  Mean Cell Hemoglobin : x  Mean Cell Hemoglobin Concentration : x  Auto Neutrophil # : x  Auto Lymphocyte # : x  Auto Monocyte # : x  Auto Eosinophil # : x  Auto Basophil # : x  Auto Neutrophil % : x  Auto Lymphocyte % : x  Auto Monocyte % : x  Auto Eosinophil % : x  Auto Basophil % : x      08-21    142  |  109<H>  |  14  ----------------------------<  99  3.8   |  29  |  0.89    Ca    8.9      21 Aug 2022 07:20                              MEDICATIONS  (STANDING):  acetaminophen     Tablet .. 1000 milliGRAM(s) Oral every 8 hours  aMIOdarone    Tablet 200 milliGRAM(s) Oral daily  gabapentin 300 milliGRAM(s) Oral three times a day  losartan 100 milliGRAM(s) Oral daily  naloxone Injectable 0.4 milliGRAM(s) IV Push once  polyethylene glycol 3350 17 Gram(s) Oral daily  senna 2 Tablet(s) Oral at bedtime

## 2022-08-22 LAB
ANION GAP SERPL CALC-SCNC: 6 MMOL/L — SIGNIFICANT CHANGE UP (ref 5–17)
BASOPHILS # BLD AUTO: 0.03 K/UL — SIGNIFICANT CHANGE UP (ref 0–0.2)
BASOPHILS NFR BLD AUTO: 0.7 % — SIGNIFICANT CHANGE UP (ref 0–2)
BUN SERPL-MCNC: 15 MG/DL — SIGNIFICANT CHANGE UP (ref 7–23)
CALCIUM SERPL-MCNC: 9.3 MG/DL — SIGNIFICANT CHANGE UP (ref 8.5–10.1)
CHLORIDE SERPL-SCNC: 110 MMOL/L — HIGH (ref 96–108)
CO2 SERPL-SCNC: 24 MMOL/L — SIGNIFICANT CHANGE UP (ref 22–31)
CREAT SERPL-MCNC: 0.88 MG/DL — SIGNIFICANT CHANGE UP (ref 0.5–1.3)
EGFR: 95 ML/MIN/1.73M2 — SIGNIFICANT CHANGE UP
EOSINOPHIL # BLD AUTO: 0.24 K/UL — SIGNIFICANT CHANGE UP (ref 0–0.5)
EOSINOPHIL NFR BLD AUTO: 6 % — SIGNIFICANT CHANGE UP (ref 0–6)
GLUCOSE SERPL-MCNC: 93 MG/DL — SIGNIFICANT CHANGE UP (ref 70–99)
HCT VFR BLD CALC: 29.5 % — LOW (ref 39–50)
HGB BLD-MCNC: 9.2 G/DL — LOW (ref 13–17)
IMM GRANULOCYTES NFR BLD AUTO: 1.2 % — SIGNIFICANT CHANGE UP (ref 0–1.5)
LYMPHOCYTES # BLD AUTO: 0.99 K/UL — LOW (ref 1–3.3)
LYMPHOCYTES # BLD AUTO: 24.7 % — SIGNIFICANT CHANGE UP (ref 13–44)
MCHC RBC-ENTMCNC: 29 PG — SIGNIFICANT CHANGE UP (ref 27–34)
MCHC RBC-ENTMCNC: 31.2 GM/DL — LOW (ref 32–36)
MCV RBC AUTO: 93.1 FL — SIGNIFICANT CHANGE UP (ref 80–100)
MONOCYTES # BLD AUTO: 0.39 K/UL — SIGNIFICANT CHANGE UP (ref 0–0.9)
MONOCYTES NFR BLD AUTO: 9.7 % — SIGNIFICANT CHANGE UP (ref 2–14)
NEUTROPHILS # BLD AUTO: 2.31 K/UL — SIGNIFICANT CHANGE UP (ref 1.8–7.4)
NEUTROPHILS NFR BLD AUTO: 57.7 % — SIGNIFICANT CHANGE UP (ref 43–77)
PLATELET # BLD AUTO: 409 K/UL — HIGH (ref 150–400)
POTASSIUM SERPL-MCNC: 4.2 MMOL/L — SIGNIFICANT CHANGE UP (ref 3.5–5.3)
POTASSIUM SERPL-SCNC: 4.2 MMOL/L — SIGNIFICANT CHANGE UP (ref 3.5–5.3)
RBC # BLD: 3.17 M/UL — LOW (ref 4.2–5.8)
RBC # FLD: 15.6 % — HIGH (ref 10.3–14.5)
SODIUM SERPL-SCNC: 140 MMOL/L — SIGNIFICANT CHANGE UP (ref 135–145)
WBC # BLD: 4.01 K/UL — SIGNIFICANT CHANGE UP (ref 3.8–10.5)
WBC # FLD AUTO: 4.01 K/UL — SIGNIFICANT CHANGE UP (ref 3.8–10.5)

## 2022-08-22 PROCEDURE — 99232 SBSQ HOSP IP/OBS MODERATE 35: CPT

## 2022-08-22 PROCEDURE — 93010 ELECTROCARDIOGRAM REPORT: CPT

## 2022-08-22 RX ORDER — FUROSEMIDE 40 MG
40 TABLET ORAL DAILY
Refills: 0 | Status: DISCONTINUED | OUTPATIENT
Start: 2022-08-22 | End: 2022-08-23

## 2022-08-22 RX ADMIN — HYDROMORPHONE HYDROCHLORIDE 2 MILLIGRAM(S): 2 INJECTION INTRAMUSCULAR; INTRAVENOUS; SUBCUTANEOUS at 18:53

## 2022-08-22 RX ADMIN — Medication 1000 MILLIGRAM(S): at 13:45

## 2022-08-22 RX ADMIN — Medication 1000 MILLIGRAM(S): at 22:39

## 2022-08-22 RX ADMIN — HYDROMORPHONE HYDROCHLORIDE 2 MILLIGRAM(S): 2 INJECTION INTRAMUSCULAR; INTRAVENOUS; SUBCUTANEOUS at 11:00

## 2022-08-22 RX ADMIN — LOSARTAN POTASSIUM 100 MILLIGRAM(S): 100 TABLET, FILM COATED ORAL at 09:47

## 2022-08-22 RX ADMIN — GABAPENTIN 300 MILLIGRAM(S): 400 CAPSULE ORAL at 13:45

## 2022-08-22 RX ADMIN — HYDROMORPHONE HYDROCHLORIDE 2 MILLIGRAM(S): 2 INJECTION INTRAMUSCULAR; INTRAVENOUS; SUBCUTANEOUS at 03:40

## 2022-08-22 RX ADMIN — GABAPENTIN 300 MILLIGRAM(S): 400 CAPSULE ORAL at 22:37

## 2022-08-22 RX ADMIN — POLYETHYLENE GLYCOL 3350 17 GRAM(S): 17 POWDER, FOR SOLUTION ORAL at 09:48

## 2022-08-22 RX ADMIN — Medication 1000 MILLIGRAM(S): at 05:36

## 2022-08-22 RX ADMIN — Medication 40 MILLIGRAM(S): at 09:47

## 2022-08-22 RX ADMIN — AMIODARONE HYDROCHLORIDE 200 MILLIGRAM(S): 400 TABLET ORAL at 09:47

## 2022-08-22 RX ADMIN — HYDROMORPHONE HYDROCHLORIDE 2 MILLIGRAM(S): 2 INJECTION INTRAMUSCULAR; INTRAVENOUS; SUBCUTANEOUS at 03:13

## 2022-08-22 RX ADMIN — GABAPENTIN 300 MILLIGRAM(S): 400 CAPSULE ORAL at 05:36

## 2022-08-22 RX ADMIN — Medication 1000 MILLIGRAM(S): at 15:00

## 2022-08-22 RX ADMIN — HYDROMORPHONE HYDROCHLORIDE 2 MILLIGRAM(S): 2 INJECTION INTRAMUSCULAR; INTRAVENOUS; SUBCUTANEOUS at 09:55

## 2022-08-22 NOTE — CONSULT NOTE ADULT - SUBJECTIVE AND OBJECTIVE BOX
Patient is a 66y old  Male who presents with a chief complaint of Back and Abdominal Pain (21 Aug 2022 16:53)    ________________________________  OLGA MENENDEZ is a 66y year old Male with a past medical history of     HPI:  65 y/o M PMHx significant for Hypertension, Atrial fibrillation on Apixaban, morbid obesity, and spinal stenosis complicated by lumbar radiculopathy who was recently admitted to [facility] (7/27/2022 - 8/17/2022) s/p elective two stage spine surgery => 7/27/2022 Stage 1 lumbar spine fusion anterior approach single level, with Lateral lumbar interbody fusion posterior approach multiple levels, and left globus robot assisted lumbar spine, and on 7/29/2022 => Stage 2 Posterior Lumbar Laminectomy Fusion Multiple Level, Iliac Fixation, TLIF L1-L2 (Posterior), Globus Robot Assisted Spine (Posterior). The patient was discharged on 8/17/2022 to [facility] where the patient was found to have worsening back pain with radiation to the right leg, and abdominal pain with distention. As per the patient and patient's daughter at the bedside they stated that the patient was in pain en route to [facility] and while being admitted. In the ED case management was consulted as the patient and family were not satisfied with the care at the facility.    (18 Aug 2022 19:36)      PREVIOUS CARDIAC WORKUP:    Echocardiogram  Stress Test  Cardiac Catheterization  ________________________________  Review of systems: A 10 point review of system has been performed, and is negative except for what has been mentioned in the above history of present illness.     PAST MEDICAL & SURGICAL HISTORY:  HTN (hypertension)      Chronic atrial fibrillation      Status post lumbar and lumbosacral fusion by anterior technique      Status post lumbar spine operation  7/27/2022 Stage 1 lumbar spine fusion anterior approach single level, Lateral lumbar interbody fusion posterior approach multiple levels, and left globus robot assisted lumbar spine     7/29/2022 Stage 2 Posterior Lumbar Laminectomy Fusion Multiple Level, Iliac Fixation, TLIF L1-L2 (Posterior), Globus Robot Assisted Spine (Posterior)        FAMILY HISTORY:  No pertinent family history in first degree relatives       The patient denies any history of premature CAD or sudden cardiac death.    SOCIAL HISTORY: The patient denies any history of tobacco abuse, alcohol abuse or illicit drug use.    ALLERGIES:  No Known Allergies    Home Medications:  amiodarone 200 mg oral tablet: 1 tab(s) orally once a day (18 Aug 2022 15:18)  apixaban 5 mg oral tablet: 1 tab(s) orally 2 times a day (18 Aug 2022 15:18)  furosemide 10 mg/mL injectable solution: 4 milliliter(s) intravenous 2 times a day (18 Aug 2022 15:18)  hydroCHLOROthiazide 50 mg oral tablet: 1 tab(s) orally once a day (18 Aug 2022 15:18)  HYDROmorphone 2 mg oral tablet: 1 tab(s) orally every 4 hours, As Needed (18 Aug 2022 15:18)  losartan 100 mg oral tablet: 1 tab(s) orally once a day (18 Aug 2022 15:18)  MiraLax oral powder for reconstitution: 17 gram(s) orally 3 times a day (18 Aug 2022 15:18)  naloxone 4 mg/0.1 mL nasal spray: ***Spray into one nostril if excess sedation, repeat every 2-3 min in alternate nostril until awake*** (18 Aug 2022 15:18)  pregabalin 150 mg oral capsule: 1 cap(s) orally 2 times a day (18 Aug 2022 15:18)  Senna 8.6 mg oral tablet: 2 tab(s) orally 2 times a day  ***for 14 days*** (18 Aug 2022 15:18)    MEDICATIONS  (STANDING):  acetaminophen     Tablet .. 1000 milliGRAM(s) Oral every 8 hours  aMIOdarone    Tablet 200 milliGRAM(s) Oral daily  gabapentin 300 milliGRAM(s) Oral three times a day  losartan 100 milliGRAM(s) Oral daily  naloxone Injectable 0.4 milliGRAM(s) IV Push once  polyethylene glycol 3350 17 Gram(s) Oral daily  senna 2 Tablet(s) Oral at bedtime    MEDICATIONS  (PRN):  aluminum hydroxide/magnesium hydroxide/simethicone Suspension 30 milliLiter(s) Oral every 4 hours PRN Dyspepsia  bisacodyl 5 milliGRAM(s) Oral daily PRN Constipation  HYDROmorphone   Tablet 2 milliGRAM(s) Oral every 4 hours PRN Severe Pain (7 - 10)  HYDROmorphone  Injectable 0.25 milliGRAM(s) IV Push every 4 hours PRN Moderate Pain (4 - 6)  HYDROmorphone  Injectable 0.5 milliGRAM(s) IV Push every 4 hours PRN Severe Pain (7 - 10)  melatonin 3 milliGRAM(s) Oral at bedtime PRN Insomnia  ondansetron Injectable 4 milliGRAM(s) IV Push every 8 hours PRN Nausea and/or Vomiting    Vital Signs Last 24 Hrs  T(C): 36.6 (21 Aug 2022 21:34), Max: 36.7 (21 Aug 2022 15:32)  T(F): 97.8 (21 Aug 2022 21:34), Max: 98 (21 Aug 2022 15:32)  HR: 75 (21 Aug 2022 21:34) (69 - 80)  BP: 130/81 (21 Aug 2022 21:34) (117/76 - 130/81)  BP(mean): --  RR: 18 (21 Aug 2022 21:34) (18 - 18)  SpO2: 100% (21 Aug 2022 21:34) (97% - 100%)    Parameters below as of 21 Aug 2022 21:34  Patient On (Oxygen Delivery Method): room air      I&O's Summary    21 Aug 2022 07:01  -  22 Aug 2022 07:00  --------------------------------------------------------  IN: 0 mL / OUT: 2400 mL / NET: -2400 mL      ________________________________  GENERAL APPEARANCE:  No acute distress  HEAD: normocephalic, atraumatic  NECK: supple, no jugular venous distention, no carotid bruit    HEART: Regular rate and rhythm, S1, S2 normal, 1/6 murmur    CHEST:  No anterior chest wall tenderness    LUNGS:  Clear to auscultation, without any wheezing, rhonchi or rales    ABDOMEN: soft, nontender, nondistended, with positive bowel sounds appreciated  EXTREMITIES: no edema.   NEURO: Alert and oriented x3  PSYC:  Normal affect  SKIN:  Dry  ________________________________   TELEMETRY:    ECG:    LABS:                        9.4    x     )-----------( x        ( 21 Aug 2022 11:43 )             30.8             08-21    142  |  109<H>  |  14  ----------------------------<  99  3.8   |  29  |  0.89    Ca    8.9      21 Aug 2022 07:20            Pro BNP  108 08-18 @ 11:33  D Dimer  -- 08-18 @ 11:33          ________________________________    RADIOLOGY & ADDITIONAL STUDIES:   IMPRESSION:    No intrinsic testicular abnormality.  Small bilateral simple hydroceles.  Severe scrotal edema    IMPRESSION: Laminectomy CT 11 through S1 with transpedicular screws and   connecting rods and postoperative fluid collection which may represent a   seroma or contained leak. Limited due to signal dropout from metallic   fixation. No obvious abnormal enhancement.    IMPRESSION:  No evidence of right lower extremity deep venous thrombosis.    ________________________________    ASSESSMENT:  Parox atrial fibrillation     PLAN:  In summary, this is a 66y Male with a past medical history of parox atrial fibrillation on anticoagulation.  Agree with holding anticoagulation.  Recent EKG show Sinus Rhythm.   Cont Amio.  Full note to follow.    ____________________________________________  (Dragon Dictation software used). Thank you for allowing me to participate in the care of your patient. Please contact me should any questions arise.    FARA Leyva DO, [facility]  Office: [phone]    Patient is a 66y old  Male who presents with a chief complaint of Back and Abdominal Pain (21 Aug 2022 16:53)    ________________________________  OLGA MENENDEZ is a 66y year old Male with a past medical history of paroxysmal atrial fibrillation on anticoagulation and antiarrhythmic therapy, hypertension,  morbid obesity, and spinal stenosis complicated by lumbar radiculopathy who was recently admitted to [facility] (7/27/2022 - 8/17/2022) s/p elective two stage spine surgery => 7/27/2022 Stage 1 lumbar spine fusion anterior approach single level, with Lateral lumbar interbody fusion posterior approach multiple levels, and left globus robot assisted lumbar spine, and on 7/29/2022 => Stage 2 Posterior Lumbar Laminectomy Fusion Multiple Level, Iliac Fixation, TLIF L1-L2 (Posterior), Globus Robot Assisted Spine (Posterior). The patient was discharged on 8/17/2022 to [facility] where the patient was found to have worsening back pain with radiation to the right leg, and abdominal pain with distention. As per the patient and patient's daughter at the bedside they stated that the patient was in pain en route to [facility] and while being admitted. In the ED case management was consulted as the patient and family were not satisfied with the care at the facility.       ________________________________  Review of systems: A 10 point review of system has been performed, and is negative except for what has been mentioned in the above history of present illness.     PAST MEDICAL & SURGICAL HISTORY:  HTN (hypertension)      Chronic atrial fibrillation      Status post lumbar and lumbosacral fusion by anterior technique      Status post lumbar spine operation  7/27/2022 Stage 1 lumbar spine fusion anterior approach single level, Lateral lumbar interbody fusion posterior approach multiple levels, and left globus robot assisted lumbar spine     7/29/2022 Stage 2 Posterior Lumbar Laminectomy Fusion Multiple Level, Iliac Fixation, TLIF L1-L2 (Posterior), Globus Robot Assisted Spine (Posterior)        FAMILY HISTORY:  No pertinent family history in first degree relatives       The patient denies any history of premature CAD or sudden cardiac death.    SOCIAL HISTORY: The patient denies any history of tobacco abuse, alcohol abuse or illicit drug use.    ALLERGIES:  No Known Allergies    Home Medications:  amiodarone 200 mg oral tablet: 1 tab(s) orally once a day (18 Aug 2022 15:18)  apixaban 5 mg oral tablet: 1 tab(s) orally 2 times a day (18 Aug 2022 15:18)  furosemide 10 mg/mL injectable solution: 4 milliliter(s) intravenous 2 times a day (18 Aug 2022 15:18)  hydroCHLOROthiazide 50 mg oral tablet: 1 tab(s) orally once a day (18 Aug 2022 15:18)  HYDROmorphone 2 mg oral tablet: 1 tab(s) orally every 4 hours, As Needed (18 Aug 2022 15:18)  losartan 100 mg oral tablet: 1 tab(s) orally once a day (18 Aug 2022 15:18)  MiraLax oral powder for reconstitution: 17 gram(s) orally 3 times a day (18 Aug 2022 15:18)  naloxone 4 mg/0.1 mL nasal spray: ***Spray into one nostril if excess sedation, repeat every 2-3 min in alternate nostril until awake*** (18 Aug 2022 15:18)  pregabalin 150 mg oral capsule: 1 cap(s) orally 2 times a day (18 Aug 2022 15:18)  Senna 8.6 mg oral tablet: 2 tab(s) orally 2 times a day  ***for 14 days*** (18 Aug 2022 15:18)    MEDICATIONS  (STANDING):  acetaminophen     Tablet .. 1000 milliGRAM(s) Oral every 8 hours  aMIOdarone    Tablet 200 milliGRAM(s) Oral daily  gabapentin 300 milliGRAM(s) Oral three times a day  losartan 100 milliGRAM(s) Oral daily  naloxone Injectable 0.4 milliGRAM(s) IV Push once  polyethylene glycol 3350 17 Gram(s) Oral daily  senna 2 Tablet(s) Oral at bedtime    MEDICATIONS  (PRN):  aluminum hydroxide/magnesium hydroxide/simethicone Suspension 30 milliLiter(s) Oral every 4 hours PRN Dyspepsia  bisacodyl 5 milliGRAM(s) Oral daily PRN Constipation  HYDROmorphone   Tablet 2 milliGRAM(s) Oral every 4 hours PRN Severe Pain (7 - 10)  HYDROmorphone  Injectable 0.25 milliGRAM(s) IV Push every 4 hours PRN Moderate Pain (4 - 6)  HYDROmorphone  Injectable 0.5 milliGRAM(s) IV Push every 4 hours PRN Severe Pain (7 - 10)  melatonin 3 milliGRAM(s) Oral at bedtime PRN Insomnia  ondansetron Injectable 4 milliGRAM(s) IV Push every 8 hours PRN Nausea and/or Vomiting    Vital Signs Last 24 Hrs  T(C): 36.6 (21 Aug 2022 21:34), Max: 36.7 (21 Aug 2022 15:32)  T(F): 97.8 (21 Aug 2022 21:34), Max: 98 (21 Aug 2022 15:32)  HR: 75 (21 Aug 2022 21:34) (69 - 80)  BP: 130/81 (21 Aug 2022 21:34) (117/76 - 130/81)  BP(mean): --  RR: 18 (21 Aug 2022 21:34) (18 - 18)  SpO2: 100% (21 Aug 2022 21:34) (97% - 100%)    Parameters below as of 21 Aug 2022 21:34  Patient On (Oxygen Delivery Method): room air      I&O's Summary    21 Aug 2022 07:01  -  22 Aug 2022 07:00  --------------------------------------------------------  IN: 0 mL / OUT: 2400 mL / NET: -2400 mL      ________________________________  GENERAL APPEARANCE:  No acute distress  HEAD: normocephalic, atraumatic  NECK: supple, no jugular venous distention, no carotid bruit    HEART: Regular rate and rhythm, S1, S2 normal, 1/6 murmur    CHEST:  No anterior chest wall tenderness    LUNGS:  Clear to auscultation, without any wheezing, rhonchi or rales    ABDOMEN: soft, nontender, nondistended, with positive bowel sounds appreciated  EXTREMITIES: Bilateral lower extremity edema  NEURO: Alert and oriented x3  PSYC:  Normal affect  SKIN:  Dry  ________________________________   TELEMETRY: Not on telemetry    ECG: Sinus rhythm    LABS:                        9.4    x     )-----------( x        ( 21 Aug 2022 11:43 )             30.8             08-21    142  |  109<H>  |  14  ----------------------------<  99  3.8   |  29  |  0.89    Ca    8.9      21 Aug 2022 07:20            Pro BNP  108 08-18 @ 11:33  D Dimer  -- 08-18 @ 11:33          ________________________________    RADIOLOGY & ADDITIONAL STUDIES:   IMPRESSION:    No intrinsic testicular abnormality.  Small bilateral simple hydroceles.  Severe scrotal edema    IMPRESSION: Laminectomy CT 11 through S1 with transpedicular screws and   connecting rods and postoperative fluid collection which may represent a   seroma or contained leak. Limited due to signal dropout from metallic   fixation. No obvious abnormal enhancement.    IMPRESSION:  No evidence of right lower extremity deep venous thrombosis.    ________________________________    ASSESSMENT:  Parox atrial fibrillation   Anemia, with small RP hematoma  Hypertension  Mild diastolic heart failure    PLAN:  In summary, this is a 66y Male with a past medical history of parox atrial fibrillation on anticoagulation.  IV diuretic for mild diastolic heart failure and edema.  Monitor creatinine.  Obtain echo.  Agree with holding anticoagulation.  Recent EKG show Sinus Rhythm.   Cont Amio.  Full note to follow.    ____________________________________________  (Dragon Dictation software used). Thank you for allowing me to participate in the care of your patient. Please contact me should any questions arise.    FARA Leyva DO, [facility]  Office: [phone]

## 2022-08-22 NOTE — PROGRESS NOTE ADULT - SUBJECTIVE AND OBJECTIVE BOX
67 y/o M PMHx significant for Hypertension, Atrial fibrillation on Apixaban, morbid obesity, and spinal stenosis complicated by lumbar radiculopathy who was recently admitted to [facility] (7/27/2022 - 8/17/2022) s/p elective two stage spine surgery => 7/27/2022 Stage 1 lumbar spine fusion anterior approach single level, with Lateral lumbar interbody fusion posterior approach multiple levels, and left globus robot assisted lumbar spine, and on 7/29/2022 => Stage 2 Posterior Lumbar Laminectomy Fusion Multiple Level, Iliac Fixation, TLIF L1-L2 (Posterior), Globus Robot Assisted Spine (Posterior). The patient was discharged on 8/17/2022 to [facility] where the patient was found to have worsening back pain with radiation to the right leg, and abdominal pain with distention. As per the patient and patient's daughter at the bedside they stated that the patient was in pain en route to [facility] and while being admitted. In the ED case management was consulted as the patient and family were not satisfied with the care at the facility. All other review of systems negative, except as noted in HPI  8/19 has some abdominal discomfort, denies SOB , no scrotal pain, , has AUR s/p straightcath  8/20 no abd pain, has scrotal edema without pain   8/21 still with R thigh radiating pain with numbness  8/22 no abd pain , hb stabl;e  physical exam    Constitutional: NAD  HEENT: Atraumatic, SAEED, Normal, No congestion  Respiratory: Breath Sounds normal, no rhonchi/wheeze  Cardiovascular: N S1S2;   Gastrointestinal: Abdomen soft, r, Bowel Ssounds present  Extremities: No edema, peripheral pulses present  Neurological: AAO x 3, no gross focal motor deficits  Skin: Non cellulitic, no rash, ulcers   scrotal edema       PHYSICAL EXAM:    Daily     Daily     ICU Vital Signs Last 24 Hrs  T(C): 36.6 (22 Aug 2022 15:27), Max: 36.6 (21 Aug 2022 21:34)  T(F): 97.9 (22 Aug 2022 15:27), Max: 97.9 (22 Aug 2022 15:27)  HR: 79 (22 Aug 2022 15:27) (75 - 79)  BP: 100/67 (22 Aug 2022 15:27) (100/67 - 135/92)  BP(mean): --  ABP: --  ABP(mean): --  RR: 18 (22 Aug 2022 15:27) (18 - 18)  SpO2: 98% (22 Aug 2022 15:27) (98% - 100%)    O2 Parameters below as of 22 Aug 2022 15:27  Patient On (Oxygen Delivery Method): room air                              9.2    4.01  )-----------( 409      ( 22 Aug 2022 07:55 )             29.5       CBC Full  -  ( 22 Aug 2022 07:55 )  WBC Count : 4.01 K/uL  RBC Count : 3.17 M/uL  Hemoglobin : 9.2 g/dL  Hematocrit : 29.5 %  Platelet Count - Automated : 409 K/uL  Mean Cell Volume : 93.1 fl  Mean Cell Hemoglobin : 29.0 pg  Mean Cell Hemoglobin Concentration : 31.2 gm/dL  Auto Neutrophil # : 2.31 K/uL  Auto Lymphocyte # : 0.99 K/uL  Auto Monocyte # : 0.39 K/uL  Auto Eosinophil # : 0.24 K/uL  Auto Basophil # : 0.03 K/uL  Auto Neutrophil % : 57.7 %  Auto Lymphocyte % : 24.7 %  Auto Monocyte % : 9.7 %  Auto Eosinophil % : 6.0 %  Auto Basophil % : 0.7 %      08-22    140  |  110<H>  |  15  ----------------------------<  93  4.2   |  24  |  0.88    Ca    9.3      22 Aug 2022 07:55                              MEDICATIONS  (STANDING):  acetaminophen     Tablet .. 1000 milliGRAM(s) Oral every 8 hours  aMIOdarone    Tablet 200 milliGRAM(s) Oral daily  furosemide   Injectable 40 milliGRAM(s) IV Push daily  gabapentin 300 milliGRAM(s) Oral three times a day  losartan 100 milliGRAM(s) Oral daily  naloxone Injectable 0.4 milliGRAM(s) IV Push once  polyethylene glycol 3350 17 Gram(s) Oral daily  senna 2 Tablet(s) Oral at bedtime

## 2022-08-22 NOTE — CONSULT NOTE ADULT - CONSULT REASON
10/26/2017    Thad Hutchins is a 15 year old male who was seen for medication management with psychotherapy.  Patient was seen for 40 minutes, 16 minutes were spent on psychotherapy.  Patient and mother presented to outpatient clinic for ongoing medication management for autism spectrum disorder, anxiety disorder.    Chief Complaint:  Follow-up appointment for autism spectrum disorder, anxiety disorder    Diagnosis:  Autism spectrum disorder.  Rule out mood disorder NOS   anxiety disorder NOS          Medications:  Current Outpatient Prescriptions   Medication Sig Dispense Refill   • escitalopram (LEXAPRO) 20 MG tablet Take 1 tablet by mouth nightly. 30 tablet 0   • MELATONIN PO      • hydrOXYzine (ATARAX) 25 MG tablet Take 1 tablet by mouth every 8 hours as needed for Anxiety. 90 tablet 3     No current facility-administered medications for this visit.        No major side effects.    Allergies:    ALLERGIES:   Allergen Reactions   • Lactose Intolerance [Milk Intolerance] DIARRHEA, PRURITUS and Cough         Vitals:    10/26/17 1237   BP: 117/68   Pulse: 85   Weight: (!) 91 kg   Height: 5' 4.76\" (1.645 m)         Subjective:  Patient reported he has been still feeling substantially anxious. Patient's mother reported that last couple of weeks have been rough. Patient reported he gets overwhelmed. He reported he worries that he is going to get poor grades and his father is going to get significantly manic. He reported his father's expectation is that he should get at least she grade in most of the classes. Patient is quite worried about it. Patient reported he worries that his parents are going to separate him. Patient reported he worries that he would have to go to group home on foster home. Patient parents have made that kind of comments in the past which made patient substantially apprehensive. No other new concerns in today's visit. In school patient has been acting out but patient is not getting 
regular sensory breaks. Patient has usually difficulty understanding social situation he gets overwhelmed then he keeps it inside and eventually later on he explodes. We talked about importance of him getting regular breaks as well as we modified the medication including we added as in when needed a medication for anxiety.  No suicidal homicidal ideations. No psychotic features. No tics. No auditory or visual hallucinations. No sense of hopelessness or worthlessness.       REVIEW OF SYSTEMS:   Psychiatric:  No history suggestive of psychosis.  No history suggestive of manic episode.  No history suggestive of illicit drug use.  Constitutional:  Weight loss  []       Weight Gain   []  No change  []   Neurological:  Headache - Yes [] No    [x]                             Rigidity - Yes [] No [x]                                 Spasticity - Yes   []  No  [x]   Gastrointestinal - Nausea  Yes  []   No  [x]          Stomach upset  Yes []     No  [x]     Comprehensive Past/Family/Social history which was performed during initial evaluation was reexamined and reviewed with patient/family.  There is nothing new to add today.  For details, please refer to my initial evaluation note in this chart.    Mental Status Examination:  Casually dressed, healthy looking, [x] well [] poorly groomed   [] old, [] boy [] girl [x] adolescent boy [] adolescent girl showed   [x] good [] partial [] no interest in interview.    Eye to eye contact was [x] maintained, [] not maintained, [] partially maintained.  Rapport established.      Mood was [] euthymic, []depressed, [] irritable, [x] anxious, [] angry, [] euphoric,   [] empty, [] guilty, [] perplexed.    It was [x] consistent, [] fluctuating, [] alternating rapidly between extremes during the interview.    Affect was [x] full, [] constricted, [] blunted, [] flat in range and [] congruent,   [] not congruent with mood.    Speech was [x] spontaneous, [] not spontaneous    Rate and rhythm was 
Severe back pain, radicular R leg pain
[x] regular, [] slow, [] pressured, [] hesitant, [] emotional,   [] dramatic, [] loud, [] monotonous, [] whispered, [] slurred.    Attention and concentration was [x] good, [] poor, [] impaired.    Thought process was [x] logical and coherent, [] illogical, [] incomprehensible.    Rate of thoughts was [x] normal, [] slow, [] hesitant, [] rapid, [] poverty of ideas,   [] overabundance of ideas, [] racing, [] flights of ideas, [] thought blocking.    Associations of thoughts:  [x] Intact, [] loose, [] circumstantial, [] tangential,   [] word salad, [] neologisms.    Thought content:    Delusions  Yes  []    No  [x]     Obsessions  Yes  []    No  [x]     Hallucinations  Yes  []    No  [x]     Tics  Yes  []    No  [x]     Suicidal ideations:  [x] none, [] passive, [] present with plan    Alert and oriented x3.  Memory-[x] immediate, [x]recent, [x] remote-intact.  Language fluent.    Judgment was [x] fair, [] poor, [] impaired.    Insight was [] good, [x] limited, [] poor.    Fund of knowledge was [x] good, [] limited, [] poor    Gait abnormality Yes  []    No  [x]         Treatment Intervention:  Patient and family agreed with above-mentioned medication management. I discussed with the patient and mother at length about how to help patient to cope with anxiety. We talked at length about how to help him to cope with his stressors. Discussion was many based on behavioral therapy principle as well as modifying his schedule and addressing his communication needs. We also talked at length about understanding patient and his perspective. We will continue to work with patient and family. Medication was modified in today's visit.  No other new concerns. Follow-up appointment will be in 6 weeks. We provided insight into patient's acting out behavior and how to manage it. Clearly patient has been decompensating because of increase in anxiety.    Majority of discussion in today's visit was based on CBT (cognitive behavioral 
anticoagulation management for AFib
therapy) principle.  Patient and family agreed with treatment plan.    Labs ordered   []   Labs reviewed   []     I will follow up with patient in 6 weeks.  If any safety concern arises, patient/family will call 911, go to ER, or nearby hospital.  Patient/family can call my office for any question or concerns during regular business hours.    Bam German MD  
Bleeding s/p spine surgery
Scrotal edema

## 2022-08-23 LAB
ANION GAP SERPL CALC-SCNC: 5 MMOL/L — SIGNIFICANT CHANGE UP (ref 5–17)
BASOPHILS # BLD AUTO: 0.02 K/UL — SIGNIFICANT CHANGE UP (ref 0–0.2)
BASOPHILS NFR BLD AUTO: 0.5 % — SIGNIFICANT CHANGE UP (ref 0–2)
BUN SERPL-MCNC: 20 MG/DL — SIGNIFICANT CHANGE UP (ref 7–23)
CALCIUM SERPL-MCNC: 9.5 MG/DL — SIGNIFICANT CHANGE UP (ref 8.5–10.1)
CHLORIDE SERPL-SCNC: 110 MMOL/L — HIGH (ref 96–108)
CO2 SERPL-SCNC: 27 MMOL/L — SIGNIFICANT CHANGE UP (ref 22–31)
CREAT SERPL-MCNC: 0.95 MG/DL — SIGNIFICANT CHANGE UP (ref 0.5–1.3)
EGFR: 89 ML/MIN/1.73M2 — SIGNIFICANT CHANGE UP
EOSINOPHIL # BLD AUTO: 0.27 K/UL — SIGNIFICANT CHANGE UP (ref 0–0.5)
EOSINOPHIL NFR BLD AUTO: 6.2 % — HIGH (ref 0–6)
GLUCOSE SERPL-MCNC: 86 MG/DL — SIGNIFICANT CHANGE UP (ref 70–99)
HCT VFR BLD CALC: 31.1 % — LOW (ref 39–50)
HGB BLD-MCNC: 9.5 G/DL — LOW (ref 13–17)
IMM GRANULOCYTES NFR BLD AUTO: 1.1 % — SIGNIFICANT CHANGE UP (ref 0–1.5)
LYMPHOCYTES # BLD AUTO: 1.25 K/UL — SIGNIFICANT CHANGE UP (ref 1–3.3)
LYMPHOCYTES # BLD AUTO: 28.7 % — SIGNIFICANT CHANGE UP (ref 13–44)
MCHC RBC-ENTMCNC: 29.1 PG — SIGNIFICANT CHANGE UP (ref 27–34)
MCHC RBC-ENTMCNC: 30.5 GM/DL — LOW (ref 32–36)
MCV RBC AUTO: 95.1 FL — SIGNIFICANT CHANGE UP (ref 80–100)
MONOCYTES # BLD AUTO: 0.53 K/UL — SIGNIFICANT CHANGE UP (ref 0–0.9)
MONOCYTES NFR BLD AUTO: 12.2 % — SIGNIFICANT CHANGE UP (ref 2–14)
NEUTROPHILS # BLD AUTO: 2.24 K/UL — SIGNIFICANT CHANGE UP (ref 1.8–7.4)
NEUTROPHILS NFR BLD AUTO: 51.3 % — SIGNIFICANT CHANGE UP (ref 43–77)
PLATELET # BLD AUTO: 401 K/UL — HIGH (ref 150–400)
POTASSIUM SERPL-MCNC: 3.9 MMOL/L — SIGNIFICANT CHANGE UP (ref 3.5–5.3)
POTASSIUM SERPL-SCNC: 3.9 MMOL/L — SIGNIFICANT CHANGE UP (ref 3.5–5.3)
RBC # BLD: 3.27 M/UL — LOW (ref 4.2–5.8)
RBC # FLD: 15.9 % — HIGH (ref 10.3–14.5)
SODIUM SERPL-SCNC: 142 MMOL/L — SIGNIFICANT CHANGE UP (ref 135–145)
WBC # BLD: 4.36 K/UL — SIGNIFICANT CHANGE UP (ref 3.8–10.5)
WBC # FLD AUTO: 4.36 K/UL — SIGNIFICANT CHANGE UP (ref 3.8–10.5)

## 2022-08-23 PROCEDURE — 99232 SBSQ HOSP IP/OBS MODERATE 35: CPT

## 2022-08-23 RX ORDER — FUROSEMIDE 40 MG
40 TABLET ORAL
Refills: 0 | Status: DISCONTINUED | OUTPATIENT
Start: 2022-08-23 | End: 2022-08-24

## 2022-08-23 RX ORDER — APIXABAN 2.5 MG/1
5 TABLET, FILM COATED ORAL EVERY 12 HOURS
Refills: 0 | Status: DISCONTINUED | OUTPATIENT
Start: 2022-08-23 | End: 2022-08-26

## 2022-08-23 RX ADMIN — Medication 40 MILLIGRAM(S): at 11:42

## 2022-08-23 RX ADMIN — Medication 40 MILLIGRAM(S): at 13:11

## 2022-08-23 RX ADMIN — HYDROMORPHONE HYDROCHLORIDE 2 MILLIGRAM(S): 2 INJECTION INTRAMUSCULAR; INTRAVENOUS; SUBCUTANEOUS at 22:48

## 2022-08-23 RX ADMIN — GABAPENTIN 300 MILLIGRAM(S): 400 CAPSULE ORAL at 23:46

## 2022-08-23 RX ADMIN — Medication 1000 MILLIGRAM(S): at 13:11

## 2022-08-23 RX ADMIN — GABAPENTIN 300 MILLIGRAM(S): 400 CAPSULE ORAL at 13:11

## 2022-08-23 RX ADMIN — HYDROMORPHONE HYDROCHLORIDE 2 MILLIGRAM(S): 2 INJECTION INTRAMUSCULAR; INTRAVENOUS; SUBCUTANEOUS at 23:30

## 2022-08-23 RX ADMIN — HYDROMORPHONE HYDROCHLORIDE 2 MILLIGRAM(S): 2 INJECTION INTRAMUSCULAR; INTRAVENOUS; SUBCUTANEOUS at 11:42

## 2022-08-23 RX ADMIN — Medication 1000 MILLIGRAM(S): at 23:30

## 2022-08-23 RX ADMIN — Medication 1000 MILLIGRAM(S): at 22:46

## 2022-08-23 RX ADMIN — HYDROMORPHONE HYDROCHLORIDE 0.5 MILLIGRAM(S): 2 INJECTION INTRAMUSCULAR; INTRAVENOUS; SUBCUTANEOUS at 05:23

## 2022-08-23 RX ADMIN — APIXABAN 5 MILLIGRAM(S): 2.5 TABLET, FILM COATED ORAL at 22:46

## 2022-08-23 RX ADMIN — GABAPENTIN 300 MILLIGRAM(S): 400 CAPSULE ORAL at 05:23

## 2022-08-23 RX ADMIN — LOSARTAN POTASSIUM 100 MILLIGRAM(S): 100 TABLET, FILM COATED ORAL at 11:42

## 2022-08-23 RX ADMIN — SENNA PLUS 2 TABLET(S): 8.6 TABLET ORAL at 22:47

## 2022-08-23 RX ADMIN — HYDROMORPHONE HYDROCHLORIDE 2 MILLIGRAM(S): 2 INJECTION INTRAMUSCULAR; INTRAVENOUS; SUBCUTANEOUS at 13:00

## 2022-08-23 RX ADMIN — AMIODARONE HYDROCHLORIDE 200 MILLIGRAM(S): 400 TABLET ORAL at 11:42

## 2022-08-23 NOTE — PROGRESS NOTE ADULT - SUBJECTIVE AND OBJECTIVE BOX
The patient was seen and examined.  Hemoglobin stable.  Edema improved.  No chest discomfort.  No shortness of breath.  Blood pressure stable.      ________________________________  OLGA MENENDEZ is a 66y year old Male with a past medical history of paroxysmal atrial fibrillation on anticoagulation and antiarrhythmic therapy, hypertension,  morbid obesity, and spinal stenosis complicated by lumbar radiculopathy who was recently admitted to [facility] (7/27/2022 - 8/17/2022) s/p elective two stage spine surgery => 7/27/2022 Stage 1 lumbar spine fusion anterior approach single level, with Lateral lumbar interbody fusion posterior approach multiple levels, and left globus robot assisted lumbar spine, and on 7/29/2022 => Stage 2 Posterior Lumbar Laminectomy Fusion Multiple Level, Iliac Fixation, TLIF L1-L2 (Posterior), Globus Robot Assisted Spine (Posterior). The patient was discharged on 8/17/2022 to [facility] [place] operated by [facility] where the patient was found to have worsening back pain with radiation to the right leg, and abdominal pain with distention. As per the patient and patient's daughter at the bedside they stated that the patient was in pain en route to [facility] and while being admitted. In the ED case management was consulted as the patient and family were not satisfied with the care at the facility.       ________________________________  Review of systems: A 10 point review of system has been performed, and is negative except for what has been mentioned in the above history of present illness.     PAST MEDICAL & SURGICAL HISTORY:  HTN (hypertension)      Chronic atrial fibrillation      Status post lumbar and lumbosacral fusion by anterior technique      Status post lumbar spine operation  7/27/2022 Stage 1 lumbar spine fusion anterior approach single level, Lateral lumbar interbody fusion posterior approach multiple levels, and left globus robot assisted lumbar spine     7/29/2022 Stage 2 Posterior Lumbar Laminectomy Fusion Multiple Level, Iliac Fixation, TLIF L1-L2 (Posterior), Globus Robot Assisted Spine (Posterior)         ALLERGIES:  No Known Allergies    Home Medications:  amiodarone 200 mg oral tablet: 1 tab(s) orally once a day (18 Aug 2022 15:18)  apixaban 5 mg oral tablet: 1 tab(s) orally 2 times a day (18 Aug 2022 15:18)  furosemide 10 mg/mL injectable solution: 4 milliliter(s) intravenous 2 times a day (18 Aug 2022 15:18)  hydroCHLOROthiazide 50 mg oral tablet: 1 tab(s) orally once a day (18 Aug 2022 15:18)  HYDROmorphone 2 mg oral tablet: 1 tab(s) orally every 4 hours, As Needed (18 Aug 2022 15:18)  losartan 100 mg oral tablet: 1 tab(s) orally once a day (18 Aug 2022 15:18)  MiraLax oral powder for reconstitution: 17 gram(s) orally 3 times a day (18 Aug 2022 15:18)  naloxone 4 mg/0.1 mL nasal spray: ***Spray into one nostril if excess sedation, repeat every 2-3 min in alternate nostril until awake*** (18 Aug 2022 15:18)  pregabalin 150 mg oral capsule: 1 cap(s) orally 2 times a day (18 Aug 2022 15:18)  Senna 8.6 mg oral tablet: 2 tab(s) orally 2 times a day  ***for 14 days*** (18 Aug 2022 15:18)      MEDICATIONS  (STANDING):  acetaminophen     Tablet .. 1000 milliGRAM(s) Oral every 8 hours  aMIOdarone    Tablet 200 milliGRAM(s) Oral daily  furosemide   Injectable 40 milliGRAM(s) IV Push two times a day  gabapentin 300 milliGRAM(s) Oral three times a day  losartan 100 milliGRAM(s) Oral daily  naloxone Injectable 0.4 milliGRAM(s) IV Push once  polyethylene glycol 3350 17 Gram(s) Oral daily  senna 2 Tablet(s) Oral at bedtime    MEDICATIONS  (PRN):  aluminum hydroxide/magnesium hydroxide/simethicone Suspension 30 milliLiter(s) Oral every 4 hours PRN Dyspepsia  bisacodyl 5 milliGRAM(s) Oral daily PRN Constipation  HYDROmorphone   Tablet 2 milliGRAM(s) Oral every 4 hours PRN Severe Pain (7 - 10)  HYDROmorphone  Injectable 0.5 milliGRAM(s) IV Push every 4 hours PRN Severe Pain (7 - 10)  HYDROmorphone  Injectable 0.25 milliGRAM(s) IV Push every 4 hours PRN Moderate Pain (4 - 6)  melatonin 3 milliGRAM(s) Oral at bedtime PRN Insomnia  ondansetron Injectable 4 milliGRAM(s) IV Push every 8 hours PRN Nausea and/or Vomiting      Vital Signs Last 24 Hrs  T(C): 36.6 (23 Aug 2022 08:11), Max: 36.6 (22 Aug 2022 15:27)  T(F): 97.8 (23 Aug 2022 08:11), Max: 97.9 (22 Aug 2022 15:27)  HR: 69 (23 Aug 2022 08:11) (69 - 79)  BP: 112/75 (23 Aug 2022 08:11) (100/67 - 115/71)  BP(mean): --  RR: 18 (23 Aug 2022 08:11) (18 - 19)  SpO2: 99% (23 Aug 2022 08:11) (98% - 100%)    Parameters below as of 22 Aug 2022 15:27  Patient On (Oxygen Delivery Method): room air      I&O's Summary    22 Aug 2022 07:01  -  23 Aug 2022 07:00  --------------------------------------------------------  IN: 0 mL / OUT: 1900 mL / NET: -1900 mL    23 Aug 2022 07:01  -  23 Aug 2022 13:52  --------------------------------------------------------  IN: 390 mL / OUT: 0 mL / NET: 390 mL        ________________________________  GENERAL APPEARANCE:  No acute distress  HEAD: normocephalic, atraumatic  NECK: supple, no jugular venous distention, no carotid bruit    HEART: Regular rate and rhythm, S1, S2 normal, 1/6 murmur    CHEST:  No anterior chest wall tenderness    LUNGS:  Clear to auscultation, without any wheezing, rhonchi or rales    ABDOMEN: soft, nontender, nondistended, with positive bowel sounds appreciated  EXTREMITIES: Bilateral lower extremity edema  NEURO: Alert and oriented x3  PSYC:  Normal affect  SKIN:  Dry  ________________________________   TELEMETRY: Not on telemetry    ECG: Sinus rhythm    LABS:        ________________________________    RADIOLOGY & ADDITIONAL STUDIES:   IMPRESSION:    No intrinsic testicular abnormality.  Small bilateral simple hydroceles.  Severe scrotal edema    IMPRESSION: Laminectomy CT 11 through S1 with transpedicular screws and   connecting rods and postoperative fluid collection which may represent a   seroma or contained leak. Limited due to signal dropout from metallic   fixation. No obvious abnormal enhancement.    IMPRESSION:  No evidence of right lower extremity deep venous thrombosis.    ________________________________    ASSESSMENT:  Parox atrial fibrillation   Anemia, with small RP hematoma  Hypertension  Mild diastolic heart failure    PLAN:  In summary, this is a 66y Male with a past medical history of parox atrial fibrillation on anticoagulation.  Continue IV diuretic but increase to twice daily dosing.  Creatinine stable.  Continue amiodarone.  Discussed with primary, anticoagulation will be resumed.  Continue losartan.  Check chemistries in the morning.      ____________________________________________  (Dragon Dictation software used). Thank you for allowing me to participate in the care of your patient. Please contact me should any questions arise.    FARA Leyva DO, [facility]  Office: [phone]

## 2022-08-23 NOTE — PROGRESS NOTE ADULT - SUBJECTIVE AND OBJECTIVE BOX
67 y/o M PMHx significant for Hypertension, Atrial fibrillation on Apixaban, morbid obesity, and spinal stenosis complicated by lumbar radiculopathy who was recently admitted to [facility] (7/27/2022 - 8/17/2022) s/p elective two stage spine surgery => 7/27/2022 Stage 1 lumbar spine fusion anterior approach single level, with Lateral lumbar interbody fusion posterior approach multiple levels, and left globus robot assisted lumbar spine, and on 7/29/2022 => Stage 2 Posterior Lumbar Laminectomy Fusion Multiple Level, Iliac Fixation, TLIF L1-L2 (Posterior), Globus Robot Assisted Spine (Posterior). The patient was discharged on 8/17/2022 to [facility] where the patient was found to have worsening back pain with radiation to the right leg, and abdominal pain with distention. As per the patient and patient's daughter at the bedside they stated that the patient was in pain en route to [facility] and while being admitted. In the ED case management was consulted as the patient and family were not satisfied with the care at the facility. All other review of systems negative, except as noted in HPI  8/19 has some abdominal discomfort, denies SOB , no scrotal pain, , has AUR s/p straightcath  8/20 no abd pain, has scrotal edema without pain   8/21 still with R thigh radiating pain with numbness  8/22 no abd pain , hb stabl;e  8/23  no CP, no sob, scrotal edema better, h/h stable , ambulating in hallway with walker    physical exam    Constitutional: NAD  HEENT: Atraumatic, SAEED, Normal, No congestion  Respiratory: Breath Sounds normal, no rhonchi/wheeze  Cardiovascular: N S1S2;   Gastrointestinal: Abdomen soft, r, Bowel Ssounds present  Extremities: No edema, peripheral pulses present  Neurological: AAO x 3, no gross focal motor deficits  Skin: Non cellulitic, no rash, ulcers   scrotal edema       PHYSICAL EXAM:    Daily     Daily     ICU Vital Signs Last 24 Hrs  T(C): 36.7 (23 Aug 2022 16:13), Max: 36.7 (23 Aug 2022 16:13)  T(F): 98.1 (23 Aug 2022 16:13), Max: 98.1 (23 Aug 2022 16:13)  HR: 81 (23 Aug 2022 16:13) (69 - 81)  BP: 108/70 (23 Aug 2022 16:13) (108/70 - 115/71)  BP(mean): --  ABP: --  ABP(mean): --  RR: 18 (23 Aug 2022 16:13) (18 - 19)  SpO2: 97% (23 Aug 2022 16:13) (97% - 100%)    O2 Parameters below as of 23 Aug 2022 16:13  Patient On (Oxygen Delivery Method): room air                              9.5    4.36  )-----------( 401      ( 23 Aug 2022 07:19 )             31.1       CBC Full  -  ( 23 Aug 2022 07:19 )  WBC Count : 4.36 K/uL  RBC Count : 3.27 M/uL  Hemoglobin : 9.5 g/dL  Hematocrit : 31.1 %  Platelet Count - Automated : 401 K/uL  Mean Cell Volume : 95.1 fl  Mean Cell Hemoglobin : 29.1 pg  Mean Cell Hemoglobin Concentration : 30.5 gm/dL  Auto Neutrophil # : 2.24 K/uL  Auto Lymphocyte # : 1.25 K/uL  Auto Monocyte # : 0.53 K/uL  Auto Eosinophil # : 0.27 K/uL  Auto Basophil # : 0.02 K/uL  Auto Neutrophil % : 51.3 %  Auto Lymphocyte % : 28.7 %  Auto Monocyte % : 12.2 %  Auto Eosinophil % : 6.2 %  Auto Basophil % : 0.5 %      08-23    142  |  110<H>  |  20  ----------------------------<  86  3.9   |  27  |  0.95    Ca    9.5      23 Aug 2022 07:19                              MEDICATIONS  (STANDING):  acetaminophen     Tablet .. 1000 milliGRAM(s) Oral every 8 hours  aMIOdarone    Tablet 200 milliGRAM(s) Oral daily  apixaban 5 milliGRAM(s) Oral every 12 hours  furosemide   Injectable 40 milliGRAM(s) IV Push two times a day  gabapentin 300 milliGRAM(s) Oral three times a day  losartan 100 milliGRAM(s) Oral daily  naloxone Injectable 0.4 milliGRAM(s) IV Push once  polyethylene glycol 3350 17 Gram(s) Oral daily  senna 2 Tablet(s) Oral at bedtime

## 2022-08-24 LAB
ANION GAP SERPL CALC-SCNC: 4 MMOL/L — LOW (ref 5–17)
BASOPHILS # BLD AUTO: 0.03 K/UL — SIGNIFICANT CHANGE UP (ref 0–0.2)
BASOPHILS NFR BLD AUTO: 0.7 % — SIGNIFICANT CHANGE UP (ref 0–2)
BUN SERPL-MCNC: 22 MG/DL — SIGNIFICANT CHANGE UP (ref 7–23)
CALCIUM SERPL-MCNC: 9.2 MG/DL — SIGNIFICANT CHANGE UP (ref 8.5–10.1)
CHLORIDE SERPL-SCNC: 107 MMOL/L — SIGNIFICANT CHANGE UP (ref 96–108)
CO2 SERPL-SCNC: 32 MMOL/L — HIGH (ref 22–31)
CREAT SERPL-MCNC: 1.02 MG/DL — SIGNIFICANT CHANGE UP (ref 0.5–1.3)
EGFR: 82 ML/MIN/1.73M2 — SIGNIFICANT CHANGE UP
EOSINOPHIL # BLD AUTO: 0.29 K/UL — SIGNIFICANT CHANGE UP (ref 0–0.5)
EOSINOPHIL NFR BLD AUTO: 6.9 % — HIGH (ref 0–6)
GLUCOSE SERPL-MCNC: 89 MG/DL — SIGNIFICANT CHANGE UP (ref 70–99)
HCT VFR BLD CALC: 27.7 % — LOW (ref 39–50)
HGB BLD-MCNC: 8.5 G/DL — LOW (ref 13–17)
IMM GRANULOCYTES NFR BLD AUTO: 1.7 % — HIGH (ref 0–1.5)
LYMPHOCYTES # BLD AUTO: 1.58 K/UL — SIGNIFICANT CHANGE UP (ref 1–3.3)
LYMPHOCYTES # BLD AUTO: 37.8 % — SIGNIFICANT CHANGE UP (ref 13–44)
MCHC RBC-ENTMCNC: 28.4 PG — SIGNIFICANT CHANGE UP (ref 27–34)
MCHC RBC-ENTMCNC: 30.7 GM/DL — LOW (ref 32–36)
MCV RBC AUTO: 92.6 FL — SIGNIFICANT CHANGE UP (ref 80–100)
MONOCYTES # BLD AUTO: 0.46 K/UL — SIGNIFICANT CHANGE UP (ref 0–0.9)
MONOCYTES NFR BLD AUTO: 11 % — SIGNIFICANT CHANGE UP (ref 2–14)
NEUTROPHILS # BLD AUTO: 1.75 K/UL — LOW (ref 1.8–7.4)
NEUTROPHILS NFR BLD AUTO: 41.9 % — LOW (ref 43–77)
PLATELET # BLD AUTO: 394 K/UL — SIGNIFICANT CHANGE UP (ref 150–400)
POTASSIUM SERPL-MCNC: 3.9 MMOL/L — SIGNIFICANT CHANGE UP (ref 3.5–5.3)
POTASSIUM SERPL-SCNC: 3.9 MMOL/L — SIGNIFICANT CHANGE UP (ref 3.5–5.3)
RBC # BLD: 2.99 M/UL — LOW (ref 4.2–5.8)
RBC # FLD: 15.8 % — HIGH (ref 10.3–14.5)
SARS-COV-2 RNA SPEC QL NAA+PROBE: SIGNIFICANT CHANGE UP
SODIUM SERPL-SCNC: 143 MMOL/L — SIGNIFICANT CHANGE UP (ref 135–145)
WBC # BLD: 4.18 K/UL — SIGNIFICANT CHANGE UP (ref 3.8–10.5)
WBC # FLD AUTO: 4.18 K/UL — SIGNIFICANT CHANGE UP (ref 3.8–10.5)

## 2022-08-24 PROCEDURE — 93306 TTE W/DOPPLER COMPLETE: CPT | Mod: 26

## 2022-08-24 PROCEDURE — 99232 SBSQ HOSP IP/OBS MODERATE 35: CPT

## 2022-08-24 RX ORDER — FUROSEMIDE 40 MG
60 TABLET ORAL
Refills: 0 | Status: DISCONTINUED | OUTPATIENT
Start: 2022-08-24 | End: 2022-08-26

## 2022-08-24 RX ADMIN — POLYETHYLENE GLYCOL 3350 17 GRAM(S): 17 POWDER, FOR SOLUTION ORAL at 10:56

## 2022-08-24 RX ADMIN — Medication 1000 MILLIGRAM(S): at 06:58

## 2022-08-24 RX ADMIN — Medication 1000 MILLIGRAM(S): at 21:11

## 2022-08-24 RX ADMIN — Medication 3 MILLIGRAM(S): at 21:11

## 2022-08-24 RX ADMIN — APIXABAN 5 MILLIGRAM(S): 2.5 TABLET, FILM COATED ORAL at 10:56

## 2022-08-24 RX ADMIN — GABAPENTIN 300 MILLIGRAM(S): 400 CAPSULE ORAL at 14:52

## 2022-08-24 RX ADMIN — GABAPENTIN 300 MILLIGRAM(S): 400 CAPSULE ORAL at 06:58

## 2022-08-24 RX ADMIN — SENNA PLUS 2 TABLET(S): 8.6 TABLET ORAL at 21:11

## 2022-08-24 RX ADMIN — AMIODARONE HYDROCHLORIDE 200 MILLIGRAM(S): 400 TABLET ORAL at 10:56

## 2022-08-24 RX ADMIN — Medication 40 MILLIGRAM(S): at 06:58

## 2022-08-24 RX ADMIN — GABAPENTIN 300 MILLIGRAM(S): 400 CAPSULE ORAL at 21:11

## 2022-08-24 RX ADMIN — Medication 60 MILLIGRAM(S): at 15:16

## 2022-08-24 RX ADMIN — HYDROMORPHONE HYDROCHLORIDE 0.5 MILLIGRAM(S): 2 INJECTION INTRAMUSCULAR; INTRAVENOUS; SUBCUTANEOUS at 18:08

## 2022-08-24 RX ADMIN — APIXABAN 5 MILLIGRAM(S): 2.5 TABLET, FILM COATED ORAL at 21:11

## 2022-08-24 RX ADMIN — Medication 1000 MILLIGRAM(S): at 14:52

## 2022-08-24 NOTE — PROGRESS NOTE ADULT - SUBJECTIVE AND OBJECTIVE BOX
The patient was seen and examined.  Edema improved.  Blood pressure stable.  No chest discomfort.  Pain improving.  Hemoglobin slightly lower.    Initial Consult HPI  ________________________________  OLGA MENENDEZ is a 66y year old Male with a past medical history of paroxysmal atrial fibrillation on anticoagulation and antiarrhythmic therapy, hypertension,  morbid obesity, and spinal stenosis complicated by lumbar radiculopathy who was recently admitted to [facility] (7/27/2022 - 8/17/2022) s/p elective two stage spine surgery => 7/27/2022 Stage 1 lumbar spine fusion anterior approach single level, with Lateral lumbar interbody fusion posterior approach multiple levels, and left globus robot assisted lumbar spine, and on 7/29/2022 => Stage 2 Posterior Lumbar Laminectomy Fusion Multiple Level, Iliac Fixation, TLIF L1-L2 (Posterior), Globus Robot Assisted Spine (Posterior). The patient was discharged on 8/17/2022 to [facility] where the patient was found to have worsening back pain with radiation to the right leg, and abdominal pain with distention. As per the patient and patient's daughter at the bedside they stated that the patient was in pain en route to [facility] and while being admitted. In the ED case management was consulted as the patient and family were not satisfied with the care at the facility.       ________________________________  Review of systems: A 10 point review of system has been performed, and is negative except for what has been mentioned in the above history of present illness.     PAST MEDICAL & SURGICAL HISTORY:  HTN (hypertension)    Chronic atrial fibrillation    Status post lumbar and lumbosacral fusion by anterior technique    Status post lumbar spine operation  7/27/2022 Stage 1 lumbar spine fusion anterior approach single level, Lateral lumbar interbody fusion posterior approach multiple levels, and left globus robot assisted lumbar spine     7/29/2022 Stage 2 Posterior Lumbar Laminectomy Fusion Multiple Level, Iliac Fixation, TLIF L1-L2 (Posterior), Globus Robot Assisted Spine (Posterior)         ALLERGIES:  No Known Allergies    Home Medications:  amiodarone 200 mg oral tablet: 1 tab(s) orally once a day (18 Aug 2022 15:18)  apixaban 5 mg oral tablet: 1 tab(s) orally 2 times a day (18 Aug 2022 15:18)  furosemide 10 mg/mL injectable solution: 4 milliliter(s) intravenous 2 times a day (18 Aug 2022 15:18)  hydroCHLOROthiazide 50 mg oral tablet: 1 tab(s) orally once a day (18 Aug 2022 15:18)  HYDROmorphone 2 mg oral tablet: 1 tab(s) orally every 4 hours, As Needed (18 Aug 2022 15:18)  losartan 100 mg oral tablet: 1 tab(s) orally once a day (18 Aug 2022 15:18)  MiraLax oral powder for reconstitution: 17 gram(s) orally 3 times a day (18 Aug 2022 15:18)  naloxone 4 mg/0.1 mL nasal spray: ***Spray into one nostril if excess sedation, repeat every 2-3 min in alternate nostril until awake*** (18 Aug 2022 15:18)  pregabalin 150 mg oral capsule: 1 cap(s) orally 2 times a day (18 Aug 2022 15:18)  Senna 8.6 mg oral tablet: 2 tab(s) orally 2 times a day  ***for 14 days*** (18 Aug 2022 15:18)      MEDICATIONS  (STANDING):  acetaminophen     Tablet .. 1000 milliGRAM(s) Oral every 8 hours  aMIOdarone    Tablet 200 milliGRAM(s) Oral daily  apixaban 5 milliGRAM(s) Oral every 12 hours  furosemide   Injectable 60 milliGRAM(s) IV Push two times a day  gabapentin 300 milliGRAM(s) Oral three times a day  losartan 100 milliGRAM(s) Oral daily  naloxone Injectable 0.4 milliGRAM(s) IV Push once  polyethylene glycol 3350 17 Gram(s) Oral daily  senna 2 Tablet(s) Oral at bedtime    MEDICATIONS  (PRN):  aluminum hydroxide/magnesium hydroxide/simethicone Suspension 30 milliLiter(s) Oral every 4 hours PRN Dyspepsia  bisacodyl 5 milliGRAM(s) Oral daily PRN Constipation  HYDROmorphone   Tablet 2 milliGRAM(s) Oral every 4 hours PRN Severe Pain (7 - 10)  HYDROmorphone  Injectable 0.25 milliGRAM(s) IV Push every 4 hours PRN Moderate Pain (4 - 6)  HYDROmorphone  Injectable 0.5 milliGRAM(s) IV Push every 4 hours PRN Severe Pain (7 - 10)  melatonin 3 milliGRAM(s) Oral at bedtime PRN Insomnia  ondansetron Injectable 4 milliGRAM(s) IV Push every 8 hours PRN Nausea and/or Vomiting      Vital Signs Last 24 Hrs  T(C): 36.3 (24 Aug 2022 06:55), Max: 36.8 (23 Aug 2022 22:38)  T(F): 97.3 (24 Aug 2022 06:55), Max: 98.3 (23 Aug 2022 22:38)  HR: 70 (24 Aug 2022 06:55) (70 - 81)  BP: 112/71 (24 Aug 2022 06:55) (108/70 - 120/71)  BP(mean): --  RR: 18 (24 Aug 2022 06:55) (18 - 18)  SpO2: 97% (24 Aug 2022 06:55) (97% - 99%)    Parameters below as of 24 Aug 2022 06:55  Patient On (Oxygen Delivery Method): room air      I&O's Summary    23 Aug 2022 07:01  -  24 Aug 2022 07:00  --------------------------------------------------------  IN: 390 mL / OUT: 1300 mL / NET: -910 mL    24 Aug 2022 07:01  -  24 Aug 2022 11:14  --------------------------------------------------------  IN: 540 mL / OUT: 500 mL / NET: 40 mL        ________________________________  GENERAL APPEARANCE:  No acute distress  HEAD: normocephalic, atraumatic  NECK: supple, no jugular venous distention, no carotid bruit    HEART: Regular rate and rhythm, S1, S2 normal, 1/6 murmur    CHEST:  No anterior chest wall tenderness    LUNGS:  Clear to auscultation, without any wheezing, rhonchi or rales    ABDOMEN: soft, nontender, nondistended, with positive bowel sounds appreciated  EXTREMITIES: Bilateral lower extremity edema  NEURO: Alert and oriented x3  PSYC:  Normal affect  SKIN:  Dry  ________________________________   TELEMETRY: Not on telemetry    ECG: Sinus rhythm    LABS:                         8.5    4.18  )-----------( 394      ( 24 Aug 2022 06:50 )             27.7          08-24    143  |  107  |  22  ----------------------------<  89  3.9   |  32<H>  |  1.02    Ca    9.2      24 Aug 2022 06:50     ________________________________    RADIOLOGY & ADDITIONAL STUDIES:   IMPRESSION:    No intrinsic testicular abnormality.  Small bilateral simple hydroceles.  Severe scrotal edema    IMPRESSION: Laminectomy CT 11 through S1 with transpedicular screws and   connecting rods and postoperative fluid collection which may represent a   seroma or contained leak. Limited due to signal dropout from metallic   fixation. No obvious abnormal enhancement.    IMPRESSION:  No evidence of right lower extremity deep venous thrombosis.    ________________________________    ASSESSMENT:  Parox atrial fibrillation   Anemia, with small RP hematoma  Hypertension  Mild diastolic heart failure    PLAN:  In summary, this is a 66y Male with a past medical history of parox atrial fibrillation on anticoagulation.  Continue IV diuretic but increase to 60 mg BID.  Hold ARB today.  Continue amiodarone.  Continue anticoagulation.  Check chemistries in the morning.    ____________________________________________  (Dragon Dictation software used). Thank you for allowing me to participate in the care of your patient. Please contact me should any questions arise.    FARA Leyva DO, [facility]  Office: [phone]

## 2022-08-24 NOTE — PROGRESS NOTE ADULT - SUBJECTIVE AND OBJECTIVE BOX
67 y/o M PMHx significant for Hypertension, Atrial fibrillation on Apixaban, morbid obesity, and spinal stenosis complicated by lumbar radiculopathy who was recently admitted to [facility] (7/27/2022 - 8/17/2022) s/p elective two stage spine surgery => 7/27/2022 Stage 1 lumbar spine fusion anterior approach single level, with Lateral lumbar interbody fusion posterior approach multiple levels, and left globus robot assisted lumbar spine, and on 7/29/2022 => Stage 2 Posterior Lumbar Laminectomy Fusion Multiple Level, Iliac Fixation, TLIF L1-L2 (Posterior), Globus Robot Assisted Spine (Posterior). The patient was discharged on 8/17/2022 to [facility] where the patient was found to have worsening back pain with radiation to the right leg, and abdominal pain with distention. As per the patient and patient's daughter at the bedside they stated that the patient was in pain en route to [facility] and while being admitted. In the ED case management was consulted as the patient and family were not satisfied with the care at the facility. All other review of systems negative, except as noted in HPI  8/19 has some abdominal discomfort, denies SOB , no scrotal pain, , has AUR s/p straightcath  8/20 no abd pain, has scrotal edema without pain   8/21 still with R thigh radiating pain with numbness  8/22 no abd pain , hb stabl;e  8/23  no CP, no sob, scrotal edema better, h/h stable , ambulating in hallway with walker  8/24 - pain is improving.  no cp, sob    ROS:   All 10 systems reviewed and found to be negative with the exception of what has been described above.    Vital Signs Last 24 Hrs  T(C): 36.3 (24 Aug 2022 06:55), Max: 36.8 (23 Aug 2022 22:38)  T(F): 97.3 (24 Aug 2022 06:55), Max: 98.3 (23 Aug 2022 22:38)  HR: 70 (24 Aug 2022 06:55) (70 - 78)  BP: 112/71 (24 Aug 2022 06:55) (112/71 - 120/71)  BP(mean): --  RR: 18 (24 Aug 2022 06:55) (18 - 18)  SpO2: 97% (24 Aug 2022 06:55) (97% - 99%)    Parameters below as of 24 Aug 2022 06:55  Patient On (Oxygen Delivery Method): room air        physical exam    Constitutional: NAD  HEENT: Atraumatic, SAEED, Normal, No congestion  Respiratory: Breath Sounds normal, no rhonchi/wheeze  Cardiovascular: N S1S2; RRR  Gastrointestinal: Abdomen soft, +BS  Extremities: No edema, peripheral pulses present  Neurological: AAO x 3, no gross focal motor deficits  Skin: Non cellulitic, no rash, ulcers   scrotal edema                                 8.5    4.18  )-----------( 394      ( 24 Aug 2022 06:50 )             27.7     08-24    143  |  107  |  22  ----------------------------<  89  3.9   |  32<H>  |  1.02    Ca    9.2      24 Aug 2022 06:50

## 2022-08-25 LAB
ANION GAP SERPL CALC-SCNC: 4 MMOL/L — LOW (ref 5–17)
APPEARANCE UR: CLEAR — SIGNIFICANT CHANGE UP
BILIRUB UR-MCNC: NEGATIVE — SIGNIFICANT CHANGE UP
BUN SERPL-MCNC: 27 MG/DL — HIGH (ref 7–23)
CALCIUM SERPL-MCNC: 9.4 MG/DL — SIGNIFICANT CHANGE UP (ref 8.5–10.1)
CHLORIDE SERPL-SCNC: 104 MMOL/L — SIGNIFICANT CHANGE UP (ref 96–108)
CO2 SERPL-SCNC: 31 MMOL/L — SIGNIFICANT CHANGE UP (ref 22–31)
COLOR SPEC: YELLOW — SIGNIFICANT CHANGE UP
CREAT ?TM UR-MCNC: 44 MG/DL — SIGNIFICANT CHANGE UP
CREAT SERPL-MCNC: 0.9 MG/DL — SIGNIFICANT CHANGE UP (ref 0.5–1.3)
DIFF PNL FLD: NEGATIVE — SIGNIFICANT CHANGE UP
EGFR: 95 ML/MIN/1.73M2 — SIGNIFICANT CHANGE UP
GLUCOSE SERPL-MCNC: 93 MG/DL — SIGNIFICANT CHANGE UP (ref 70–99)
GLUCOSE UR QL: NEGATIVE — SIGNIFICANT CHANGE UP
HCT VFR BLD CALC: 31.6 % — LOW (ref 39–50)
HGB BLD-MCNC: 9.7 G/DL — LOW (ref 13–17)
KETONES UR-MCNC: NEGATIVE — SIGNIFICANT CHANGE UP
LEUKOCYTE ESTERASE UR-ACNC: NEGATIVE — SIGNIFICANT CHANGE UP
MCHC RBC-ENTMCNC: 28.6 PG — SIGNIFICANT CHANGE UP (ref 27–34)
MCHC RBC-ENTMCNC: 30.7 GM/DL — LOW (ref 32–36)
MCV RBC AUTO: 93.2 FL — SIGNIFICANT CHANGE UP (ref 80–100)
NITRITE UR-MCNC: NEGATIVE — SIGNIFICANT CHANGE UP
PH UR: 5 — SIGNIFICANT CHANGE UP (ref 5–8)
PLATELET # BLD AUTO: 436 K/UL — HIGH (ref 150–400)
POTASSIUM SERPL-MCNC: 3.9 MMOL/L — SIGNIFICANT CHANGE UP (ref 3.5–5.3)
POTASSIUM SERPL-SCNC: 3.9 MMOL/L — SIGNIFICANT CHANGE UP (ref 3.5–5.3)
PROT ?TM UR-MCNC: <5 MG/DL — SIGNIFICANT CHANGE UP (ref 0–12)
PROT UR-MCNC: NEGATIVE — SIGNIFICANT CHANGE UP
PROT/CREAT UR-RTO: <0.1 RATIO — SIGNIFICANT CHANGE UP (ref 0–0.2)
RBC # BLD: 3.39 M/UL — LOW (ref 4.2–5.8)
RBC # FLD: 15.7 % — HIGH (ref 10.3–14.5)
SARS-COV-2 RNA SPEC QL NAA+PROBE: SIGNIFICANT CHANGE UP
SODIUM SERPL-SCNC: 139 MMOL/L — SIGNIFICANT CHANGE UP (ref 135–145)
SP GR SPEC: 1.01 — SIGNIFICANT CHANGE UP (ref 1.01–1.02)
UROBILINOGEN FLD QL: NEGATIVE — SIGNIFICANT CHANGE UP
WBC # BLD: 4.74 K/UL — SIGNIFICANT CHANGE UP (ref 3.8–10.5)
WBC # FLD AUTO: 4.74 K/UL — SIGNIFICANT CHANGE UP (ref 3.8–10.5)

## 2022-08-25 PROCEDURE — 99232 SBSQ HOSP IP/OBS MODERATE 35: CPT

## 2022-08-25 RX ORDER — HYDROMORPHONE HYDROCHLORIDE 2 MG/ML
2 INJECTION INTRAMUSCULAR; INTRAVENOUS; SUBCUTANEOUS EVERY 4 HOURS
Refills: 0 | Status: DISCONTINUED | OUTPATIENT
Start: 2022-08-26 | End: 2022-08-26

## 2022-08-25 RX ADMIN — Medication 1000 MILLIGRAM(S): at 06:05

## 2022-08-25 RX ADMIN — GABAPENTIN 300 MILLIGRAM(S): 400 CAPSULE ORAL at 13:39

## 2022-08-25 RX ADMIN — Medication 60 MILLIGRAM(S): at 20:07

## 2022-08-25 RX ADMIN — POLYETHYLENE GLYCOL 3350 17 GRAM(S): 17 POWDER, FOR SOLUTION ORAL at 10:07

## 2022-08-25 RX ADMIN — Medication 1000 MILLIGRAM(S): at 23:04

## 2022-08-25 RX ADMIN — HYDROMORPHONE HYDROCHLORIDE 2 MILLIGRAM(S): 2 INJECTION INTRAMUSCULAR; INTRAVENOUS; SUBCUTANEOUS at 06:05

## 2022-08-25 RX ADMIN — SENNA PLUS 2 TABLET(S): 8.6 TABLET ORAL at 22:05

## 2022-08-25 RX ADMIN — HYDROMORPHONE HYDROCHLORIDE 2 MILLIGRAM(S): 2 INJECTION INTRAMUSCULAR; INTRAVENOUS; SUBCUTANEOUS at 15:12

## 2022-08-25 RX ADMIN — Medication 60 MILLIGRAM(S): at 10:11

## 2022-08-25 RX ADMIN — GABAPENTIN 300 MILLIGRAM(S): 400 CAPSULE ORAL at 05:07

## 2022-08-25 RX ADMIN — APIXABAN 5 MILLIGRAM(S): 2.5 TABLET, FILM COATED ORAL at 22:06

## 2022-08-25 RX ADMIN — HYDROMORPHONE HYDROCHLORIDE 2 MILLIGRAM(S): 2 INJECTION INTRAMUSCULAR; INTRAVENOUS; SUBCUTANEOUS at 23:04

## 2022-08-25 RX ADMIN — Medication 1000 MILLIGRAM(S): at 13:39

## 2022-08-25 RX ADMIN — HYDROMORPHONE HYDROCHLORIDE 2 MILLIGRAM(S): 2 INJECTION INTRAMUSCULAR; INTRAVENOUS; SUBCUTANEOUS at 10:11

## 2022-08-25 RX ADMIN — Medication 1000 MILLIGRAM(S): at 05:07

## 2022-08-25 RX ADMIN — GABAPENTIN 300 MILLIGRAM(S): 400 CAPSULE ORAL at 22:05

## 2022-08-25 RX ADMIN — HYDROMORPHONE HYDROCHLORIDE 2 MILLIGRAM(S): 2 INJECTION INTRAMUSCULAR; INTRAVENOUS; SUBCUTANEOUS at 22:05

## 2022-08-25 RX ADMIN — AMIODARONE HYDROCHLORIDE 200 MILLIGRAM(S): 400 TABLET ORAL at 10:07

## 2022-08-25 RX ADMIN — Medication 1000 MILLIGRAM(S): at 22:06

## 2022-08-25 RX ADMIN — HYDROMORPHONE HYDROCHLORIDE 0.25 MILLIGRAM(S): 2 INJECTION INTRAMUSCULAR; INTRAVENOUS; SUBCUTANEOUS at 01:05

## 2022-08-25 RX ADMIN — HYDROMORPHONE HYDROCHLORIDE 0.25 MILLIGRAM(S): 2 INJECTION INTRAMUSCULAR; INTRAVENOUS; SUBCUTANEOUS at 01:18

## 2022-08-25 RX ADMIN — APIXABAN 5 MILLIGRAM(S): 2.5 TABLET, FILM COATED ORAL at 10:16

## 2022-08-25 RX ADMIN — HYDROMORPHONE HYDROCHLORIDE 2 MILLIGRAM(S): 2 INJECTION INTRAMUSCULAR; INTRAVENOUS; SUBCUTANEOUS at 05:07

## 2022-08-25 NOTE — PROGRESS NOTE ADULT - ASSESSMENT
66 year old male presents with acute anemia s/p elective two stage spine surgery => 7/27/2022. HH appears stable around 7.4-7.6/24. s/p 1 U PRBC. No concern for compartment syndrome at this time. Neurovascularly intact at this time. Small left RP hematoma, no intraabdominal hemorrhage.    Plan:  Recommend scrotal support  Monitor vitals  Trend HH if weary of bleeding q 6, transfuse as needed  Hold Eliquis if concerned about bleeding  Spine surgery recommendations appreciated  Recommend transfer to [facility] surgeons once stable for management  Physical therapy  No general surgical intervention at this time  Rest of management as per primary team.    Plan discussed with Dr. Wise.  
67 y/o M PMHx significant for Hypertension, Atrial fibrillation on Apixaban, morbid obesity, and spinal stenosis complicated by lumbar radiculopathy who was recently admitted to [facility] (7/27/2022 - 8/17/2022) s/p elective two stage spine surgery => 7/27/2022 Stage 1 lumbar spine fusion anterior approach single level, with Lateral lumbar interbody fusion posterior approach multiple levels, and left globus robot assisted lumbar spine, and on 7/29/2022 => Stage 2 Posterior Lumbar Laminectomy Fusion Multiple Level, Iliac Fixation, TLIF L1-L2 (Posterior), Globus Robot Assisted Spine (Posterior). The patient was discharged on 8/17/2022 to [facility] where the patient was found to have worsening back pain with radiation to the right leg, and abdominal pain with distention. As per the patient and patient's daughter at the bedside they stated that the patient was in pain en route to [facility] and while being admitted. In the ED case management was consulted as the patient and family were not satisfied with the care at the facility.     #Anemia with Severe Lower Back Pain Radiating to the Right Leg   ~admit to Medicine  ~Orthopedics/Spine consultation greatly appreciated  ~Type and Screen  ~transfuse prn  ~serial CBCs  ~cont. pain management PRN  ~NWB/WBAT   ~Keep splint/dressing clean and dry.   ~per Orthopedics => do not remove dressing/splint until follow up in the office.   ~f/u CT LS spine  ~f/u CTA Abdomen/Pelvis as concern for possible retroperitoneal bleed vs epidural hematoma   ~cont. NeuroVascular Checks to monitor for compartment signs    #Atrial Fibrillation  ~will hold Apixaban 5mg po bid for now  ~cont. Amiodarone 200mg po daily    #Hypertension  ~patient with labile blood pressures  ~will hold HCTZ (takes 50mg po daily)    #Severe Protein Calorie Malnutrition  ~Albumin 2.2  ~patient w/ notable third spacing  ~f/u w/ Nutrition in the am    #Vte ppx  ~cont. SCDs for now  ~as above will hold Apixaban  
8/22 holding anticoagulation monitor h/H .    65 y/o M PMHx significant for Hypertension, Atrial fibrillation on Apixaban, morbid obesity, and spinal stenosis complicated by lumbar radiculopathy who was recently admitted to [facility] (7/27/2022 - 8/17/2022) s/p elective two stage spine surgery => 7/27/2022 Stage 1 lumbar spine fusion anterior approach single level, with Lateral lumbar interbody fusion posterior approach multiple levels, and left globus robot assisted lumbar spine, and on 7/29/2022 => Stage 2 Posterior Lumbar Laminectomy Fusion Multiple Level, Iliac Fixation, TLIF L1-L2 (Posterior), Globus Robot Assisted Spine (Posterior). The patient was discharged on 8/17/2022 to [facility] where the patient was found to have worsening back pain with radiation to the right leg, and abdominal pain with distention. As per the patient and patient's daughter at the bedside they stated that the patient was in pain en route to [facility] and while being admitted. In the ED case management was consulted as the patient and family were not satisfied with the care at the facility.     #acute blood loss Anemia with Severe Lower Back Pain Radiating to the Right Leg sp Anterior/ Posterior Fusion of T11-Pelvis at outside hospital   #Small left RP hematoma,  ~admit to Medicine  Small left RP hematoma, no intraabdominal hemorrhage    .No concern for compartment syndrome at this time.  Hold Eliquis  No general surgical intervention at this time~NWB/WBAT   ~Keep splint/dressing clean and dry.   ~per Orthopedics => do not remove dressing/splint until follow up in the office.   ~f/u CT LS spine  ~f/u CTA Abdomen/Pelvis as concern for possible retroperitoneal bleed vs epidural hematoma   ~cont. NeuroVascular Checks to monitor for compartment signs  spoke with ortho RLE numbness and pain expected after this type of surgery needs rehab     #Atrial Fibrillation  ~will hold Apixaban 5mg po bid for now  ~cont. Amiodarone 200mg po daily    #Hypertension  ~patient with labile blood pressures  ~will hold HCTZ (takes 50mg po daily)    #Severe Protein Calorie Malnutrition  ~Albumin 2.2  ~patient w/ notable third spacing  ~f/u w/ Nutrition in the am    #Vte ppx  ~cont. SCDs for now  ~as above will hold Apixaban    
8/22 holding anticoagulation monitor h/H .    65 y/o M PMHx significant for Hypertension, Atrial fibrillation on Apixaban, morbid obesity, and spinal stenosis complicated by lumbar radiculopathy who was recently admitted to [facility] (7/27/2022 - 8/17/2022) s/p elective two stage spine surgery => 7/27/2022 Stage 1 lumbar spine fusion anterior approach single level, with Lateral lumbar interbody fusion posterior approach multiple levels, and left globus robot assisted lumbar spine, and on 7/29/2022 => Stage 2 Posterior Lumbar Laminectomy Fusion Multiple Level, Iliac Fixation, TLIF L1-L2 (Posterior), Globus Robot Assisted Spine (Posterior). The patient was discharged on 8/17/2022 to [facility] where the patient was found to have worsening back pain with radiation to the right leg, and abdominal pain with distention. As per the patient and patient's daughter at the bedside they stated that the patient was in pain en route to [facility] and while being admitted. In the ED case management was consulted as the patient and family were not satisfied with the care at the facility.     #acute blood loss Anemia with Severe Lower Back Pain Radiating to the Right Leg sp Anterior/ Posterior Fusion of T11-Pelvis at outside hospital   #Small left RP hematoma remains stable , likely complication of spinal fusion surgery- H/H stable     Small left RP hematoma, no intraabdominal hemorrhage  No concern for compartment syndrome at this time.  Resume eliquis 8/23 I dw with Dr Wise , check H/h in AM  if stable , then ok for discharge to rehab   No general surgical intervention at this time~  ambulate as tolerated   CT LS spine amd MRI spine report reviewed  spoke with ortho RLE numbness and pain expected after this type of surgery needs rehab   physiatry eval for acute rehab - daughter requesting acute rehab    #Atrial Fibrillation  resume eliquis   ~cont. Amiodarone 200mg po daily    # scrotal edema   Scrotal edema /likely dependent  edema   on IV lasix 40 BID - cardio consult appreciated   will dw with cardio abt switching  to po in am    #Hypertension  Continue IV diuretic but increase to twice daily dosing.  Creatinine stable  .Continue losartan., HCTZ held while on IV lasix   )    #Severe Protein Calorie Malnutrition  ~Albumin 2.2  ~patient w/ notable third spacing  ~f/u w/ Nutrition in the am    #Vte ppx  resumed eliquis    
8/22 holding anticoagulation monitor h/H .    65 y/o M PMHx significant for Hypertension, Atrial fibrillation on Apixaban, morbid obesity, and spinal stenosis complicated by lumbar radiculopathy who was recently admitted to [facility] (7/27/2022 - 8/17/2022) s/p elective two stage spine surgery => 7/27/2022 Stage 1 lumbar spine fusion anterior approach single level, with Lateral lumbar interbody fusion posterior approach multiple levels, and left globus robot assisted lumbar spine, and on 7/29/2022 => Stage 2 Posterior Lumbar Laminectomy Fusion Multiple Level, Iliac Fixation, TLIF L1-L2 (Posterior), Globus Robot Assisted Spine (Posterior). The patient was discharged on 8/17/2022 to [facility] where the patient was found to have worsening back pain with radiation to the right leg, and abdominal pain with distention. As per the patient and patient's daughter at the bedside they stated that the patient was in pain en route to [facility] and while being admitted. In the ED case management was consulted as the patient and family were not satisfied with the care at the facility.     #acute blood loss Anemia with Severe Lower Back Pain Radiating to the Right Leg sp Anterior/ Posterior Fusion of T11-Pelvis at outside hospital   #Small left RP hematoma remains stable , likely complication of spinal fusion surgery- H/H stable     Small left RP hematoma, no intraabdominal hemorrhage  No concern for compartment syndrome at this time.  Resume eliquis 8/23 I dw with Dr Wise.  small drop in h/h noted will check cbc in AM 8/25  No general surgical intervention at this time~  ambulate as tolerated   CT LS spine amd MRI spine report reviewed  spoke with ortho RLE numbness and pain expected after this type of surgery needs rehab   physiatry eval for acute rehab - daughter requesting acute rehab - not a cndidate as per GC as per CM     #Atrial Fibrillation  resume eliquis   ~cont. Amiodarone 200mg po daily    # scrotal edema   Scrotal edema /likely dependent  edema   on IV lasix 60 BID dose increase today as per cardio  - cardio consult appreciated   will dw with cardio abt switching  to po in am    #Hypertension  Continue IV diuretic but increase to twice daily dosing.  Creatinine stable  .Continue losartan., HCTZ held while on IV lasix     #Severe Protein Calorie Malnutrition  ~Albumin 2.2  ~patient w/ notable third spacing  ~f/u w/ Nutrition in the am    #Vte ppx  resumed eliquis    dispo - dc in AM to [facility] in [facility] 
8/22 holding anticoagulation monitor h/H .    67 y/o M PMHx significant for Hypertension, Atrial fibrillation on Apixaban, morbid obesity, and spinal stenosis complicated by lumbar radiculopathy who was recently admitted to [facility] (7/27/2022 - 8/17/2022) s/p elective two stage spine surgery => 7/27/2022 Stage 1 lumbar spine fusion anterior approach single level, with Lateral lumbar interbody fusion posterior approach multiple levels, and left globus robot assisted lumbar spine, and on 7/29/2022 => Stage 2 Posterior Lumbar Laminectomy Fusion Multiple Level, Iliac Fixation, TLIF L1-L2 (Posterior), Globus Robot Assisted Spine (Posterior). The patient was discharged on 8/17/2022 to [facility] where the patient was found to have worsening back pain with radiation to the right leg, and abdominal pain with distention. As per the patient and patient's daughter at the bedside they stated that the patient was in pain en route to [facility] and while being admitted. In the ED case management was consulted as the patient and family were not satisfied with the care at the facility.     #acute blood loss Anemia with Severe Lower Back Pain Radiating to the Right Leg sp Anterior/ Posterior Fusion of T11-Pelvis at outside hospital   #Small left RP hematoma remains stable , likely complication of spinal fusion surgery- H/H stable     Small left RP hematoma, no intraabdominal hemorrhage  No concern for compartment syndrome at this time.  Resume eliquis 8/23 I dw with Dr Wise.  small drop in h/h noted will check cbc in AM 8/25 - hgb stable   No general surgical intervention at this time~  ambulate as tolerated   CT LS spine amd MRI spine report reviewed  spoke with ortho RLE numbness and pain expected after this type of surgery needs rehab   physiatry eval for acute rehab - daughter requesting acute rehab - not a cndidate as per GC as per CM     #Atrial Fibrillation  resume eliquis   ~cont. Amiodarone 200mg po daily    # scrotal edema   Scrotal edema /likely dependent  edema   on IV lasix 60 BID dose increase td on 8/25 and will continue with IV lasix for antoher day as per dr coker.   will dw with cardio abt switching  to po in am 8/26    #Hypertension  Continue IV diuretic but increase to twice daily dosing.  Creatinine stable  .Continue losartan., HCTZ held while on IV lasix     #Severe Protein Calorie Malnutrition  ~Albumin 2.2  ~patient w/ notable third spacing  ~f/u w/ Nutrition in the am    #Vte ppx  resumed eliquis    dispo - dc in AM to [facility] in [facility] 
65 y/o M PMHx significant for Hypertension, Atrial fibrillation on Apixaban, morbid obesity, and spinal stenosis complicated by lumbar radiculopathy who was recently admitted to [facility] (7/27/2022 - 8/17/2022) s/p elective two stage spine surgery => 7/27/2022 Stage 1 lumbar spine fusion anterior approach single level, with Lateral lumbar interbody fusion posterior approach multiple levels, and left globus robot assisted lumbar spine, and on 7/29/2022 => Stage 2 Posterior Lumbar Laminectomy Fusion Multiple Level, Iliac Fixation, TLIF L1-L2 (Posterior), Globus Robot Assisted Spine (Posterior). The patient was discharged on 8/17/2022 to [facility] where the patient was found to have worsening back pain with radiation to the right leg, and abdominal pain with distention. As per the patient and patient's daughter at the bedside they stated that the patient was in pain en route to [facility] and while being admitted. In the ED case management was consulted as the patient and family were not satisfied with the care at the facility.     #acute blood loss Anemia with Severe Lower Back Pain Radiating to the Right Leg sp Anterior/ Posterior Fusion of T11-Pelvis at outside hospital   #Small left RP hematoma,  ~admit to Medicine  Small left RP hematoma, no intraabdominal hemorrhage  .No concern for compartment syndrome at this time.  Hold Eliquis  No general surgical intervention at this time~NWB/WBAT   ~Keep splint/dressing clean and dry.   ~per Orthopedics => do not remove dressing/splint until follow up in the office.   ~f/u CT LS spine  ~f/u CTA Abdomen/Pelvis as concern for possible retroperitoneal bleed vs epidural hematoma   ~cont. NeuroVascular Checks to monitor for compartment signs    #Atrial Fibrillation  ~will hold Apixaban 5mg po bid for now  ~cont. Amiodarone 200mg po daily    #Hypertension  ~patient with labile blood pressures  ~will hold HCTZ (takes 50mg po daily)    #Severe Protein Calorie Malnutrition  ~Albumin 2.2  ~patient w/ notable third spacing  ~f/u w/ Nutrition in the am    #Vte ppx  ~cont. SCDs for now  ~as above will hold Apixaban      
67 y/o M PMHx significant for Hypertension, Atrial fibrillation on Apixaban, morbid obesity, and spinal stenosis complicated by lumbar radiculopathy who was recently admitted to [facility] (7/27/2022 - 8/17/2022) s/p elective two stage spine surgery => 7/27/2022 Stage 1 lumbar spine fusion anterior approach single level, with Lateral lumbar interbody fusion posterior approach multiple levels, and left globus robot assisted lumbar spine, and on 7/29/2022 => Stage 2 Posterior Lumbar Laminectomy Fusion Multiple Level, Iliac Fixation, TLIF L1-L2 (Posterior), Globus Robot Assisted Spine (Posterior). The patient was discharged on 8/17/2022 to [facility] where the patient was found to have worsening back pain with radiation to the right leg, and abdominal pain with distention. As per the patient and patient's daughter at the bedside they stated that the patient was in pain en route to [facility] and while being admitted. In the ED case management was consulted as the patient and family were not satisfied with the care at the facility.     #acute blood loss Anemia with Severe Lower Back Pain Radiating to the Right Leg sp Anterior/ Posterior Fusion of T11-Pelvis at outside hospital   #Small left RP hematoma,  ~admit to Medicine  Small left RP hematoma, no intraabdominal hemorrhage    .No concern for compartment syndrome at this time.  Hold Eliquis  No general surgical intervention at this time~NWB/WBAT   ~Keep splint/dressing clean and dry.   ~per Orthopedics => do not remove dressing/splint until follow up in the office.   ~f/u CT LS spine  ~f/u CTA Abdomen/Pelvis as concern for possible retroperitoneal bleed vs epidural hematoma   ~cont. NeuroVascular Checks to monitor for compartment signs  spoke with ortho RLE numbness and pain expected after this type of surgery needs rehab     #Atrial Fibrillation  ~will hold Apixaban 5mg po bid for now  ~cont. Amiodarone 200mg po daily    #Hypertension  ~patient with labile blood pressures  ~will hold HCTZ (takes 50mg po daily)    #Severe Protein Calorie Malnutrition  ~Albumin 2.2  ~patient w/ notable third spacing  ~f/u w/ Nutrition in the am    #Vte ppx  ~cont. SCDs for now  ~as above will hold Apixaban

## 2022-08-25 NOTE — PROGRESS NOTE ADULT - SUBJECTIVE AND OBJECTIVE BOX
67 y/o M PMHx significant for Hypertension, Atrial fibrillation on Apixaban, morbid obesity, and spinal stenosis complicated by lumbar radiculopathy who was [facility] to [facility] (7/27/2022 - 8/17/2022) s/p elective two stage spine surgery => 7/27/2022 Stage 1 lumbar spine fusion anterior approach single level, with Lateral lumbar interbody fusion posterior approach multiple levels, and left globus robot assisted lumbar spine, and on 7/29/2022 => Stage 2 Posterior Lumbar Laminectomy Fusion Multiple Level, Iliac Fixation, TLIF L1-L2 (Posterior), Globus Robot Assisted Spine (Posterior). The patient was discharged on 8/17/2022 to [facility] where the patient was found to have worsening back pain with radiation to the right leg, and abdominal pain with distention. As per the patient and patient's daughter at the bedside they stated that the patient was in pain en route to [facility] and while being admitted. In the ED case management was consulted as the patient and family were not satisfied with the care at the facility. All other review of systems negative, except as noted in HPI  8/19 has some abdominal discomfort, denies SOB , no scrotal pain, , has AUR s/p straightcath  8/20 no abd pain, has scrotal edema without pain   8/21 still with R thigh radiating pain with numbness  8/22 no abd pain , hb stable  8/23  no CP, no sob, scrotal edema better, h/h stable , ambulating in hallway with walker  8/24 - pain is improving.  no cp, sob  8/25 - pt ambulating with walker.      ROS:   All 10 systems reviewed and found to be negative with the exception of what has been described above.    Vital Signs Last 24 Hrs  T(C): 36.7 (25 Aug 2022 08:07), Max: 36.7 (24 Aug 2022 15:32)  T(F): 98 (25 Aug 2022 08:07), Max: 98.1 (24 Aug 2022 15:32)  HR: 74 (25 Aug 2022 10:05) (64 - 79)  BP: 116/66 (25 Aug 2022 10:05) (98/68 - 141/78)  BP(mean): --  RR: 19 (25 Aug 2022 08:07) (18 - 19)  SpO2: 100% (25 Aug 2022 08:07) (96% - 100%)        Parameters below as of 25 Aug 2022 08:07  Patient On (Oxygen Delivery Method): room air    physical exam    Constitutional: NAD  HEENT: Atraumatic, SAEED, Normal, No congestion  Respiratory: Breath Sounds normal, no rhonchi/wheeze  Cardiovascular: N S1S2; RRR  Gastrointestinal: Abdomen soft, +BS  Extremities: No edema, peripheral pulses present  Neurological: AAO x 3, no gross focal motor deficits  Skin: Non cellulitic, no rash, ulcers   scrotal edema without erythema                                9.7    4.74  )-----------( 436      ( 25 Aug 2022 06:55 )             31.6     08-25    139  |  104  |  27<H>  ----------------------------<  93  3.9   |  31  |  0.90    Ca    9.4      25 Aug 2022 06:55

## 2022-08-25 NOTE — PROGRESS NOTE ADULT - SUBJECTIVE AND OBJECTIVE BOX
Patient was seen and examined.  Edema improved but not resolved.  Blood pressure stable, with 1 isolated episode on the lower side.  No dizziness.  No chest discomfort.  Tolerate anticoagulation.  Hemoglobin stable.    Initial Consult HPI  ________________________________  OLGA MENENDEZ is a 66y year old Male with a past medical history of paroxysmal atrial fibrillation on anticoagulation and antiarrhythmic therapy, hypertension,  morbid obesity, and spinal stenosis complicated by lumbar radiculopathy who was recently admitted to [facility] (7/27/2022 - 8/17/2022) s/p elective two stage spine surgery => 7/27/2022 Stage 1 lumbar spine fusion anterior approach single level, with Lateral lumbar interbody fusion posterior approach multiple levels, and left globus robot assisted lumbar spine, and on 7/29/2022 => Stage 2 Posterior Lumbar Laminectomy Fusion Multiple Level, Iliac Fixation, TLIF L1-L2 (Posterior), Globus Robot Assisted Spine (Posterior). The patient was discharged on 8/17/2022 to [facility] where the patient was found to have worsening back pain with radiation to the right leg, and abdominal pain with distention. As per the patient and patient's daughter at the bedside they stated that the patient was in pain en route to [facility] and while being admitted. In the ED case management was consulted as the patient and family were not satisfied with the care at the facility.       ________________________________  Review of systems: A 10 point review of system has been performed, and is negative except for what has been mentioned in the above history of present illness.     PAST MEDICAL & SURGICAL HISTORY:  HTN (hypertension)    Chronic atrial fibrillation    Status post lumbar and lumbosacral fusion by anterior technique    Status post lumbar spine operation  7/27/2022 Stage 1 lumbar spine fusion anterior approach single level, Lateral lumbar interbody fusion posterior approach multiple levels, and left globus robot assisted lumbar spine     7/29/2022 Stage 2 Posterior Lumbar Laminectomy Fusion Multiple Level, Iliac Fixation, TLIF L1-L2 (Posterior), Globus Robot Assisted Spine (Posterior)       ALLERGIES:  No Known Allergies    Home Medications:  amiodarone 200 mg oral tablet: 1 tab(s) orally once a day (18 Aug 2022 15:18)  apixaban 5 mg oral tablet: 1 tab(s) orally 2 times a day (18 Aug 2022 15:18)  furosemide 10 mg/mL injectable solution: 4 milliliter(s) intravenous 2 times a day (18 Aug 2022 15:18)  hydroCHLOROthiazide 50 mg oral tablet: 1 tab(s) orally once a day (18 Aug 2022 15:18)  HYDROmorphone 2 mg oral tablet: 1 tab(s) orally every 4 hours, As Needed (18 Aug 2022 15:18)  losartan 100 mg oral tablet: 1 tab(s) orally once a day (18 Aug 2022 15:18)  MiraLax oral powder for reconstitution: 17 gram(s) orally 3 times a day (18 Aug 2022 15:18)  naloxone 4 mg/0.1 mL nasal spray: ***Spray into one nostril if excess sedation, repeat every 2-3 min in alternate nostril until awake*** (18 Aug 2022 15:18)  pregabalin 150 mg oral capsule: 1 cap(s) orally 2 times a day (18 Aug 2022 15:18)  Senna 8.6 mg oral tablet: 2 tab(s) orally 2 times a day  ***for 14 days*** (18 Aug 2022 15:18)    MEDICATIONS  (STANDING):  acetaminophen     Tablet .. 1000 milliGRAM(s) Oral every 8 hours  aMIOdarone    Tablet 200 milliGRAM(s) Oral daily  apixaban 5 milliGRAM(s) Oral every 12 hours  furosemide   Injectable 60 milliGRAM(s) IV Push two times a day  gabapentin 300 milliGRAM(s) Oral three times a day  losartan 100 milliGRAM(s) Oral daily  naloxone Injectable 0.4 milliGRAM(s) IV Push once  polyethylene glycol 3350 17 Gram(s) Oral daily  senna 2 Tablet(s) Oral at bedtime    MEDICATIONS  (PRN):  aluminum hydroxide/magnesium hydroxide/simethicone Suspension 30 milliLiter(s) Oral every 4 hours PRN Dyspepsia  bisacodyl 5 milliGRAM(s) Oral daily PRN Constipation  HYDROmorphone   Tablet 2 milliGRAM(s) Oral every 4 hours PRN Severe Pain (7 - 10)  melatonin 3 milliGRAM(s) Oral at bedtime PRN Insomnia  ondansetron Injectable 4 milliGRAM(s) IV Push every 8 hours PRN Nausea and/or Vomiting      Vital Signs Last 24 Hrs  T(C): 36.7 (25 Aug 2022 08:07), Max: 36.7 (24 Aug 2022 15:32)  T(F): 98 (25 Aug 2022 08:07), Max: 98.1 (24 Aug 2022 15:32)  HR: 74 (25 Aug 2022 10:05) (64 - 79)  BP: 116/66 (25 Aug 2022 10:05) (98/68 - 141/78)  BP(mean): --  RR: 19 (25 Aug 2022 08:07) (18 - 19)  SpO2: 100% (25 Aug 2022 08:07) (96% - 100%)    Parameters below as of 25 Aug 2022 08:07  Patient On (Oxygen Delivery Method): room air      I&O's Summary    24 Aug 2022 07:01  -  25 Aug 2022 07:00  --------------------------------------------------------  IN: 540 mL / OUT: 1650 mL / NET: -1110 mL      ________________________________  GENERAL APPEARANCE:  No acute distress  HEAD: normocephalic, atraumatic  NECK: supple, no jugular venous distention, no carotid bruit    HEART: Regular rate and rhythm, S1, S2 normal, 1/6 murmur    CHEST:  No anterior chest wall tenderness    LUNGS:  Clear to auscultation, without any wheezing, rhonchi or rales    ABDOMEN: soft, nontender, nondistended, with positive bowel sounds appreciated  EXTREMITIES: Bilateral lower extremity edema  NEURO: Alert and oriented x3  PSYC:  Normal affect  SKIN:  Dry  ________________________________   TELEMETRY: Not on telemetry    ECG: Sinus rhythm    LABS:                         8.5    4.18  )-----------( 394      ( 24 Aug 2022 06:50 )             27.7          08-24    143  |  107  |  22  ----------------------------<  89  3.9   |  32<H>  |  1.02    Ca    9.2      24 Aug 2022 06:50     ________________________________    RADIOLOGY & ADDITIONAL STUDIES:   IMPRESSION:    No intrinsic testicular abnormality.  Small bilateral simple hydroceles.  Severe scrotal edema    IMPRESSION: Laminectomy CT 11 through S1 with transpedicular screws and   connecting rods and postoperative fluid collection which may represent a   seroma or contained leak. Limited due to signal dropout from metallic   fixation. No obvious abnormal enhancement.    IMPRESSION:  No evidence of right lower extremity deep venous thrombosis.    ________________________________    ASSESSMENT:  Parox atrial fibrillation   Anemia, with small RP hematoma  Hypertension  Mild diastolic heart failure    PLAN:  In summary, this is a 66y Male with a past medical history of parox atrial fibrillation on anticoagulation.    Given persistent edema, continue with diuretic and monitor renal function.  Hold ARB today.  Continue anticoagulation, hemoglobin improved from yesterday.  Repeat chemistries in the morning.  Continue amiodarone.  Likely transition to oral diuretic in the morning with outpatient follow-up.      ____________________________________________  (Dragon Dictation software used). Thank you for allowing me to participate in the care of your patient. Please contact me should any questions arise.    FARA Leyva DO, [facility]  Office: [phone]

## 2022-08-26 VITALS
OXYGEN SATURATION: 99 % | TEMPERATURE: 97 F | SYSTOLIC BLOOD PRESSURE: 113 MMHG | RESPIRATION RATE: 18 BRPM | HEART RATE: 72 BPM | DIASTOLIC BLOOD PRESSURE: 80 MMHG

## 2022-08-26 LAB
ALBUMIN SERPL ELPH-MCNC: 2.9 G/DL — LOW (ref 3.3–5)
ALP SERPL-CCNC: 139 U/L — HIGH (ref 40–120)
ALT FLD-CCNC: 67 U/L — SIGNIFICANT CHANGE UP (ref 12–78)
ANION GAP SERPL CALC-SCNC: 6 MMOL/L — SIGNIFICANT CHANGE UP (ref 5–17)
AST SERPL-CCNC: 44 U/L — HIGH (ref 15–37)
BILIRUB SERPL-MCNC: 0.3 MG/DL — SIGNIFICANT CHANGE UP (ref 0.2–1.2)
BUN SERPL-MCNC: 24 MG/DL — HIGH (ref 7–23)
CALCIUM SERPL-MCNC: 9.9 MG/DL — SIGNIFICANT CHANGE UP (ref 8.5–10.1)
CHLORIDE SERPL-SCNC: 100 MMOL/L — SIGNIFICANT CHANGE UP (ref 96–108)
CO2 SERPL-SCNC: 32 MMOL/L — HIGH (ref 22–31)
CREAT SERPL-MCNC: 1.13 MG/DL — SIGNIFICANT CHANGE UP (ref 0.5–1.3)
EGFR: 72 ML/MIN/1.73M2 — SIGNIFICANT CHANGE UP
GLUCOSE SERPL-MCNC: 95 MG/DL — SIGNIFICANT CHANGE UP (ref 70–99)
POTASSIUM SERPL-MCNC: 3.6 MMOL/L — SIGNIFICANT CHANGE UP (ref 3.5–5.3)
POTASSIUM SERPL-SCNC: 3.6 MMOL/L — SIGNIFICANT CHANGE UP (ref 3.5–5.3)
PROT SERPL-MCNC: 7.5 GM/DL — SIGNIFICANT CHANGE UP (ref 6–8.3)
SODIUM SERPL-SCNC: 138 MMOL/L — SIGNIFICANT CHANGE UP (ref 135–145)

## 2022-08-26 PROCEDURE — 99232 SBSQ HOSP IP/OBS MODERATE 35: CPT

## 2022-08-26 RX ORDER — CX-024414 0.2 MG/ML
0.25 INJECTION, SUSPENSION INTRAMUSCULAR ONCE
Refills: 0 | Status: COMPLETED | OUTPATIENT
Start: 2022-08-26 | End: 2022-08-26

## 2022-08-26 RX ORDER — LOSARTAN POTASSIUM 100 MG/1
1 TABLET, FILM COATED ORAL
Qty: 0 | Refills: 0 | DISCHARGE

## 2022-08-26 RX ORDER — FUROSEMIDE 40 MG
4 TABLET ORAL
Qty: 0 | Refills: 0 | DISCHARGE

## 2022-08-26 RX ORDER — POTASSIUM CHLORIDE 20 MEQ
1 PACKET (EA) ORAL
Qty: 0 | Refills: 0 | DISCHARGE

## 2022-08-26 RX ORDER — GABAPENTIN 400 MG/1
1 CAPSULE ORAL
Qty: 0 | Refills: 0 | DISCHARGE
Start: 2022-08-26

## 2022-08-26 RX ORDER — LANOLIN ALCOHOL/MO/W.PET/CERES
1 CREAM (GRAM) TOPICAL
Qty: 0 | Refills: 0 | DISCHARGE
Start: 2022-08-26

## 2022-08-26 RX ORDER — ACETAMINOPHEN 500 MG
2 TABLET ORAL
Qty: 0 | Refills: 0 | DISCHARGE
Start: 2022-08-26

## 2022-08-26 RX ORDER — FUROSEMIDE 40 MG
1 TABLET ORAL
Qty: 0 | Refills: 0 | DISCHARGE

## 2022-08-26 RX ADMIN — POLYETHYLENE GLYCOL 3350 17 GRAM(S): 17 POWDER, FOR SOLUTION ORAL at 10:28

## 2022-08-26 RX ADMIN — APIXABAN 5 MILLIGRAM(S): 2.5 TABLET, FILM COATED ORAL at 10:28

## 2022-08-26 RX ADMIN — HYDROMORPHONE HYDROCHLORIDE 2 MILLIGRAM(S): 2 INJECTION INTRAMUSCULAR; INTRAVENOUS; SUBCUTANEOUS at 20:17

## 2022-08-26 RX ADMIN — GABAPENTIN 300 MILLIGRAM(S): 400 CAPSULE ORAL at 13:35

## 2022-08-26 RX ADMIN — HYDROMORPHONE HYDROCHLORIDE 2 MILLIGRAM(S): 2 INJECTION INTRAMUSCULAR; INTRAVENOUS; SUBCUTANEOUS at 19:00

## 2022-08-26 RX ADMIN — HYDROMORPHONE HYDROCHLORIDE 2 MILLIGRAM(S): 2 INJECTION INTRAMUSCULAR; INTRAVENOUS; SUBCUTANEOUS at 18:25

## 2022-08-26 RX ADMIN — Medication 1000 MILLIGRAM(S): at 05:49

## 2022-08-26 RX ADMIN — Medication 60 MILLIGRAM(S): at 13:36

## 2022-08-26 RX ADMIN — HYDROMORPHONE HYDROCHLORIDE 2 MILLIGRAM(S): 2 INJECTION INTRAMUSCULAR; INTRAVENOUS; SUBCUTANEOUS at 05:50

## 2022-08-26 RX ADMIN — Medication 60 MILLIGRAM(S): at 05:50

## 2022-08-26 RX ADMIN — HYDROMORPHONE HYDROCHLORIDE 2 MILLIGRAM(S): 2 INJECTION INTRAMUSCULAR; INTRAVENOUS; SUBCUTANEOUS at 10:26

## 2022-08-26 RX ADMIN — Medication 1000 MILLIGRAM(S): at 14:00

## 2022-08-26 RX ADMIN — HYDROMORPHONE HYDROCHLORIDE 2 MILLIGRAM(S): 2 INJECTION INTRAMUSCULAR; INTRAVENOUS; SUBCUTANEOUS at 11:00

## 2022-08-26 RX ADMIN — Medication 1000 MILLIGRAM(S): at 13:35

## 2022-08-26 RX ADMIN — GABAPENTIN 300 MILLIGRAM(S): 400 CAPSULE ORAL at 05:49

## 2022-08-26 RX ADMIN — AMIODARONE HYDROCHLORIDE 200 MILLIGRAM(S): 400 TABLET ORAL at 10:29

## 2022-08-26 NOTE — PROGRESS NOTE ADULT - PROVIDER SPECIALTY LIST ADULT
Cardiology
Hospitalist
Hospitalist
Orthopedics
Hospitalist
Hospitalist
Orthopedics
Surgery
Cardiology
Hospitalist

## 2022-08-26 NOTE — PROGRESS NOTE ADULT - REASON FOR ADMISSION
Back and Abdominal Pain

## 2022-08-26 NOTE — DISCHARGE NOTE NURSING/CASE MANAGEMENT/SOCIAL WORK - PATIENT PORTAL LINK FT
You can access the FollowMyHealth Patient Portal offered by [facility] by registering at the following website: [email or web address]. By joining "[employer]"’s FollowMyHealth portal, you will also be able to view your health information using other applications (apps) compatible with our system.

## 2022-08-26 NOTE — DISCHARGE NOTE PROVIDER - HOSPITAL COURSE
spine surgeon - dorota shah  cardio - dr. owens    67 y/o M PMHx significant for Hypertension, Atrial fibrillation on Apixaban, morbid obesity, and spinal stenosis complicated by lumbar radiculopathy who was recently admitted to [facility] (7/27/2022 - 8/17/2022) s/p elective two stage spine surgery => 7/27/2022 Stage 1 lumbar spine fusion anterior approach single level, with Lateral lumbar interbody fusion posterior approach multiple levels, and left globus robot assisted lumbar spine, and on 7/29/2022 => Stage 2 Posterior Lumbar Laminectomy Fusion Multiple Level, Iliac Fixation, TLIF L1-L2 (Posterior), Globus Robot Assisted Spine (Posterior). The patient was discharged on 8/17/2022 to [facility] where the patient was found to have worsening back pain with radiation to the right leg, and abdominal pain with distention. As per the patient and patient's daughter at the bedside they stated that the patient was in pain en route to [facility] and while being admitted. In the ED case management was consulted as the patient and family were not satisfied with the care at the facility. All other review of systems negative, except as noted in HPI    pt had abd pain on admission and noted to have acute ur retention s/p straight cath with resolution noted.  pt noted to have acute blood loss Anemia with Severe Lower Back Pain Radiating to the Right Leg sp Anterior/ Posterior Fusion of T11-Pelvis at outside hospital.  the small left RP hematoma remains stable and was likely complication of spinal fusion surgery at outside hospital.  pt was seen by surgery and was cleared to resume eliquis on 8/23 with hgb remiain  stable.      #acute blood loss Anemia with Severe Lower Back Pain Radiating to the Right Leg sp Anterior/ Posterior Fusion of T11-Pelvis at outside hospital   #Small left RP hematoma remains stable , likely complication of spinal fusion surgery- H/H stable   Small left RP hematoma, no intraabdominal hemorrhage  No concern for compartment syndrome at this time.  Resume eliquis 8/23 I dw with Dr Wise. hgb stable  ambulate as tolerated   CT LS spine amd MRI spine report reviewed  spoke with ortho RLE numbness and pain expected after this type of surgery needs rehab   physiatry eval for acute rehab - daughter requesting acute rehab - not a cndidate as per GC as per CM     #Atrial Fibrillation  resume eliquis   ~cont. Amiodarone 200mg po daily  cardio input noted     # scrotal edema   Scrotal edema /likely dependent  edema   case d/w dr coker and will dc on 40 mg lasix bid since he was tolerating 60 mg IV bid here.    #Hypertension/mild diastolic CHF  - continue losartan but decrease to 50 mg given we are stopping HCTZ and discharging on bid lasix  case d/w dr coker and will dc on 40 mg lasix bid since he was tolerating 60 mg IV bid here.  - add kcl supplementation  - check BMP while at [facility] to monitor lytes and renal function     #Severe Protein Calorie Malnutrition  ~Albumin 2.2  ~patient w/ notable third spacing    #Vte ppx  resumed eliquis    < from: MR Lumbar Spine w/wo IV Cont (08.19.22 @ 15:06) >      IMPRESSION: Laminectomy CT 11 through S1 with transpedicular screws and   connecting rods and postoperative fluid collection which may represent a   seroma or contained leak. Limited due to signal dropout from metallic   fixation. No obvious abnormal enhancement.    < from: US Testicles (08.19.22 @ 18:47) >  No intrinsic testicular abnormality.  Small bilateral simple hydroceles.  Severe scrotal edema    < from: CT Abdomen and Pelvis w/ IV Cont (08.18.22 @ 21:44) >    IMPRESSION:  1. There is an approximately 7 cm lower left  retroperitoneal fluid collection with mild surrounding stranding.   Attenuation  coefficient measurement is not reliable due to dense streak artifact from  spinal fusion hardware. This is most likely a small retroperitoneal   hematoma.  No rim enhancement to suggest abscess formation; postoperative seroma  not excluded.  2. Evaluation of the spinal canal precluded by substantial artifact.  3. Incompletely visualized dense edema of the suprapubic subcutaneous   tissue,  visualized portion of the base of the penis, and superior scrotum. No  discernible soft tissue gas or abscess.      < from: US Duplex Venous Lower Ext Ltd, Right (08.18.22 @ 22:21) >    IMPRESSION:  No evidence of right lower extremity deep venous thrombosis.      .

## 2022-08-26 NOTE — DISCHARGE NOTE NURSING/CASE MANAGEMENT/SOCIAL WORK - NSDCPEFALRISK_GEN_ALL_CORE
For information on Fall & Injury Prevention, visit: [email or web address] OR  [email or web address] OR  https://www.cdc.gov/steadi/patient.html

## 2022-08-26 NOTE — DISCHARGE NOTE PROVIDER - NSDCCPCAREPLAN_GEN_ALL_CORE_FT
PRINCIPAL DISCHARGE DIAGNOSIS  Diagnosis: Anemia due to chronic blood loss  Assessment and Plan of Treatment: you had bleeding in your RP space post surgery  follow up with surgeon post rehab  return to er if severe pain, fever or chills

## 2022-08-26 NOTE — DISCHARGE NOTE PROVIDER - NSDCMRMEDTOKEN_GEN_ALL_CORE_FT
acetaminophen 500 mg oral tablet: 2 tab(s) orally every 8 hours  amiodarone 200 mg oral tablet: 1 tab(s) orally once a day  apixaban 5 mg oral tablet: 1 tab(s) orally 2 times a day  bisacodyl 5 mg oral delayed release tablet: 1 tab(s) orally once a day, As needed, Constipation  gabapentin 300 mg oral capsule: 1 cap(s) orally 3 times a day  HYDROmorphone 2 mg oral tablet: 1 tab(s) orally every 4 hours, As Needed  Lasix 40 mg oral tablet: 1 tab(s) orally 2 times a day  losartan 50 mg oral tablet: 1 tab(s) orally once a day  melatonin 3 mg oral tablet: 1 tab(s) orally once a day (at bedtime), As needed, Insomnia  MiraLax oral powder for reconstitution: 17 gram(s) orally 3 times a day  naloxone 4 mg/0.1 mL nasal spray: ***Spray into one nostril if excess sedation, repeat every 2-3 min in alternate nostril until awake***  potassium chloride 10 mEq oral tablet, extended release: 1 tab(s) orally 2 times a day  Senna 8.6 mg oral tablet: 2 tab(s) orally 2 times a day  ***for 14 days***

## 2022-08-26 NOTE — PROGRESS NOTE ADULT - SUBJECTIVE AND OBJECTIVE BOX
Patient was seen and examined.  Edema slowly improving.  No dizziness.  No chest discomfort.  Tolerate anticoagulation.  Hemoglobin stable.    Initial Consult HPI  ________________________________  OLGA MENENDEZ is a 66y year old Male with a past medical history of paroxysmal atrial fibrillation on anticoagulation and antiarrhythmic therapy, hypertension,  morbid obesity, and spinal stenosis complicated by lumbar radiculopathy who was recently admitted to [facility] (2022 - 2022) s/p elective two stage spine surgery => 2022 Stage 1 lumbar spine fusion anterior approach single level, with Lateral lumbar interbody fusion posterior approach multiple levels, and left globus robot assisted lumbar spine, and on 2022 => Stage 2 Posterior Lumbar Laminectomy Fusion Multiple Level, Iliac Fixation, TLIF L1-L2 (Posterior), Globus Robot Assisted Spine (Posterior). The patient was discharged on 2022 to [facility] where the patient was found to have worsening back pain with radiation to the right leg, and abdominal pain with distention. As per the patient and patient's daughter at the bedside they stated that the patient was in pain en route to [facility] and while being admitted. In the ED case management was consulted as the patient and family were not satisfied with the care at the facility.   ________________________________  Review of systems: A 10 point review of system has been performed, and is negative except for what has been mentioned in the above history of present illness.     PAST MEDICAL & SURGICAL HISTORY:  HTN (hypertension)    Chronic atrial fibrillation    Status post lumbar and lumbosacral fusion by anterior technique    Status post lumbar spine operation  2022 Stage 1 lumbar spine fusion anterior approach single level, Lateral lumbar interbody fusion posterior approach multiple levels, and left globus robot assisted lumbar spine     2022 Stage 2 Posterior Lumbar Laminectomy Fusion Multiple Level, Iliac Fixation, TLIF L1-L2 (Posterior), Globus Robot Assisted Spine (Posterior)    ALLERGIES:  No Known Allergies    Home Medications:  amiodarone 200 mg oral tablet: 1 tab(s) orally once a day (18 Aug 2022 15:18)  apixaban 5 mg oral tablet: 1 tab(s) orally 2 times a day (18 Aug 2022 15:18)  furosemide 10 mg/mL injectable solution: 4 milliliter(s) intravenous 2 times a day (18 Aug 2022 15:18)  hydroCHLOROthiazide 50 mg oral tablet: 1 tab(s) orally once a day (18 Aug 2022 15:18)  HYDROmorphone 2 mg oral tablet: 1 tab(s) orally every 4 hours, As Needed (18 Aug 2022 15:18)  losartan 100 mg oral tablet: 1 tab(s) orally once a day (18 Aug 2022 15:18)  MiraLax oral powder for reconstitution: 17 gram(s) orally 3 times a day (18 Aug 2022 15:18)  naloxone 4 mg/0.1 mL nasal spray: ***Spray into one nostril if excess sedation, repeat every 2-3 min in alternate nostril until awake*** (18 Aug 2022 15:18)  pregabalin 150 mg oral capsule: 1 cap(s) orally 2 times a day (18 Aug 2022 15:18)  Senna 8.6 mg oral tablet: 2 tab(s) orally 2 times a day  ***for 14 days*** (18 Aug 2022 15:18)    MEDICATIONS  (STANDING):  acetaminophen     Tablet .. 1000 milliGRAM(s) Oral every 8 hours  aMIOdarone    Tablet 200 milliGRAM(s) Oral daily  apixaban 5 milliGRAM(s) Oral every 12 hours  furosemide   Injectable 60 milliGRAM(s) IV Push two times a day  gabapentin 300 milliGRAM(s) Oral three times a day  losartan 100 milliGRAM(s) Oral daily  naloxone Injectable 0.4 milliGRAM(s) IV Push once  polyethylene glycol 3350 17 Gram(s) Oral daily  senna 2 Tablet(s) Oral at bedtime    MEDICATIONS  (PRN):  aluminum hydroxide/magnesium hydroxide/simethicone Suspension 30 milliLiter(s) Oral every 4 hours PRN Dyspepsia  bisacodyl 5 milliGRAM(s) Oral daily PRN Constipation  HYDROmorphone   Tablet 2 milliGRAM(s) Oral every 4 hours PRN Severe Pain (7 - 10)  melatonin 3 milliGRAM(s) Oral at bedtime PRN Insomnia  ondansetron Injectable 4 milliGRAM(s) IV Push every 8 hours PRN Nausea and/or Vomiting    Vital Signs Last 24 Hrs  T(C): 36.4 (26 Aug 2022 15:50), Max: 36.9 (26 Aug 2022 08:28)  T(F): 97.5 (26 Aug 2022 15:50), Max: 98.5 (26 Aug 2022 08:28)  HR: 77 (26 Aug 2022 15:50) (70 - 92)  BP: 113/70 (26 Aug 2022 15:50) (105/65 - 113/77)  BP(mean): --  RR: 18 (26 Aug 2022 15:50) (18 - 18)  SpO2: 98% (26 Aug 2022 15:50) (96% - 98%)    Parameters below as of 26 Aug 2022 15:50  Patient On (Oxygen Delivery Method): room air      I&O's Summary    25 Aug 2022 07:01  -  26 Aug 2022 07:00  --------------------------------------------------------  IN: 0 mL / OUT: 3620 mL / NET: -3620 mL    ________________________________  GENERAL APPEARANCE:  No acute distress  HEAD: normocephalic, atraumatic  NECK: supple, no jugular venous distention, no carotid bruit    HEART: Regular rate and rhythm, S1, S2 normal, 1/6 murmur    CHEST:  No anterior chest wall tenderness    LUNGS:  Clear to auscultation, without any wheezing, rhonchi or rales    ABDOMEN: soft, nontender, nondistended, with positive bowel sounds appreciated  EXTREMITIES: Bilateral lower extremity edema  NEURO: Alert and oriented x3  PSYC:  Normal affect  SKIN:  Dry  ________________________________   TELEMETRY: Not on telemetry    ECG: Sinus rhythm    LABS:                                9.7    4.74  )-----------( 436      ( 25 Aug 2022 06:55 )             31.6          08-    138  |  100  |  24<H>  ----------------------------<  95  3.6   |  32<H>  |  1.13    Ca    9.9      26 Aug 2022 07:02    TPro  7.5  /  Alb  2.9<L>  /  TBili  0.3  /  DBili  x   /  AST  44<H>  /  ALT  67  /  AlkPhos  139<H>      Urinalysis Basic - ( 25 Aug 2022 19:30 )    Color: Yellow / Appearance: Clear / S.010 / pH: x  Gluc: x / Ketone: Negative  / Bili: Negative / Urobili: Negative   Blood: x / Protein: Negative / Nitrite: Negative   Leuk Esterase: Negative / RBC: x / WBC x   Sq Epi: x / Non Sq Epi: x / Bacteria: x    ________________________________    RADIOLOGY & ADDITIONAL STUDIES:   IMPRESSION:    No intrinsic testicular abnormality.  Small bilateral simple hydroceles.  Severe scrotal edema    IMPRESSION: Laminectomy CT 11 through S1 with transpedicular screws and   connecting rods and postoperative fluid collection which may represent a   seroma or contained leak. Limited due to signal dropout from metallic   fixation. No obvious abnormal enhancement.    IMPRESSION:  No evidence of right lower extremity deep venous thrombosis.    ________________________________    ASSESSMENT:  Parox atrial fibrillation   Anemia, with small RP hematoma  Hypertension  Mild diastolic heart failure    PLAN:  In summary, this is a 66y Male with a past medical history of parox atrial fibrillation on anticoagulation.    Cont IV lasix at current dose. Likely transition to 40mg BID Sat or Sun pending response.  Hold ARB again today.  Continue anticoagulation.  Cont to monitor BMP.  Continue amiodarone.  DVT ppx  ____________________________________________  (Dragon Dictation software used). Thank you for allowing me to participate in the care of your patient. Please contact me should any questions arise.    FARA Leyva DO, FACC  Office: [phone]

## 2022-08-26 NOTE — DISCHARGE NOTE PROVIDER - CARE PROVIDER_API CALL
PATRICK MISHRA  Orthopaedic Surgery  [address] [place] [address]  Phone: [phone]  Fax: [phone]  Follow Up Time:     Carlos Leyva (DO; MAJOR)  Cardiology  [address] [place] [address]  Phone: [phone]  Fax: [phone]  Follow Up Time:

## 2022-08-26 NOTE — DISCHARGE NOTE PROVIDER - NSDCPNSUBOBJ_GEN_ALL_CORE
spine surgeon - dorota shah  cardio - dr. coker    67 y/o M PMHx significant for Hypertension, Atrial fibrillation on Apixaban, morbid obesity, and spinal stenosis complicated by lumbar radiculopathy who was recently admitted to [facility] (7/27/2022 - 8/17/2022) s/p elective two stage spine surgery => 7/27/2022 Stage 1 lumbar spine fusion anterior approach single level, with Lateral lumbar interbody fusion posterior approach multiple levels, and left globus robot assisted lumbar spine, and on 7/29/2022 => Stage 2 Posterior Lumbar Laminectomy Fusion Multiple Level, Iliac Fixation, TLIF L1-L2 (Posterior), Globus Robot Assisted Spine (Posterior). The patient was discharged on 8/17/2022 to [facility] where the patient was found to have worsening back pain with radiation to the right leg, and abdominal pain with distention. As per the patient and patient's daughter at the bedside they stated that the patient was in pain en route to [facility] and while being admitted. In the ED case management was consulted as the patient and family were not satisfied with the care at the facility. All other review of systems negative, except as noted in HPI    pt had abd pain on admission and noted to have acute ur retention s/p straight cath with resolution noted.  pt noted to have acute blood loss Anemia with Severe Lower Back Pain Radiating to the Right Leg sp Anterior/ Posterior Fusion of T11-Pelvis at outside hospital.  the small left RP hematoma remains stable and was likely complication of spinal fusion surgery at outside hospital.  pt was seen by surgery and was cleared to resume eliquis on 8/23 with hgb remiain  stable.      #acute blood loss Anemia with Severe Lower Back Pain Radiating to the Right Leg sp Anterior/ Posterior Fusion of T11-Pelvis at outside hospital   #Small left RP hematoma remains stable , likely complication of spinal fusion surgery- H/H stable   Small left RP hematoma, no intraabdominal hemorrhage  No concern for compartment syndrome at this time.  Resume eliquis 8/23 I dw with Dr Wise. hgb stable  ambulate as tolerated   CT LS spine amd MRI spine report reviewed  spoke with ortho RLE numbness and pain expected after this type of surgery needs rehab   physiatry eval for acute rehab - daughter requesting acute rehab - not a cndidate as per GC as per CM     #Atrial Fibrillation  resume eliquis   ~cont. Amiodarone 200mg po daily  cardio input noted     # scrotal edema   Scrotal edema /likely dependent  edema   continue lasix and monitor.  - while inpt he was on IV lasix 60 BID     #Hypertension/mild diastolic CHF  - continue lasix, losartan    #Severe Protein Calorie Malnutrition  ~Albumin 2.2  ~patient w/ notable third spacing  ~f/u w/ Nutrition in the am    #Vte ppx  resumed eliquis    [place]po [place] [facility] [place] [facility]       Vital Signs Last 24 Hrs  T(C): 36.9 (26 Aug 2022 08:28), Max: 36.9 (26 Aug 2022 08:28)  T(F): 98.5 (26 Aug 2022 08:28), Max: 98.5 (26 Aug 2022 08:28)  HR: 92 (26 Aug 2022 08:28) (70 - 92)  BP: 113/77 (26 Aug 2022 08:28) (105/65 - 113/77)  BP(mean): --  RR: 18 (26 Aug 2022 08:28) (18 - 18)  SpO2: 98% (26 Aug 2022 08:28) (96% - 98%)    Parameters below as of 26 Aug 2022 08:28  Patient On (Oxygen Delivery Method): room air      physical exam    Constitutional: NAD  HEENT: Atraumatic, SAEED, Normal, No congestion  Respiratory: Breath Sounds normal, no rhonchi/wheeze  Cardiovascular: N S1S2; RRR  Gastrointestinal: Abdomen soft, +BS  Extremities: No edema, peripheral pulses present  Neurological: AAO x 3, no gross focal motor deficits  Skin: Non cellulitic, no rash, ulcers   scrotal edema without erythema    total time spent 40 mins
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
## 2022-09-07 DIAGNOSIS — N50.89 OTHER SPECIFIED DISORDERS OF THE MALE GENITAL ORGANS: ICD-10-CM

## 2022-09-07 DIAGNOSIS — N43.3 HYDROCELE, UNSPECIFIED: ICD-10-CM

## 2022-09-07 DIAGNOSIS — E66.01 MORBID (SEVERE) OBESITY DUE TO EXCESS CALORIES: ICD-10-CM

## 2022-09-07 DIAGNOSIS — D62 ACUTE POSTHEMORRHAGIC ANEMIA: ICD-10-CM

## 2022-09-07 DIAGNOSIS — R33.9 RETENTION OF URINE, UNSPECIFIED: ICD-10-CM

## 2022-09-07 DIAGNOSIS — D50.0 IRON DEFICIENCY ANEMIA SECONDARY TO BLOOD LOSS (CHRONIC): ICD-10-CM

## 2022-09-07 DIAGNOSIS — E43 UNSPECIFIED SEVERE PROTEIN-CALORIE MALNUTRITION: ICD-10-CM

## 2022-09-07 DIAGNOSIS — M96.843 POSTPROCEDURAL SEROMA OF A MUSCULOSKELETAL STRUCTURE FOLLOWING OTHER PROCEDURE: ICD-10-CM
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
## 2022-09-07 DIAGNOSIS — Z79.01 LONG TERM (CURRENT) USE OF ANTICOAGULANTS: ICD-10-CM

## 2022-09-07 DIAGNOSIS — R18.8 OTHER ASCITES: ICD-10-CM

## 2022-09-07 DIAGNOSIS — I97.621 POSTPROCEDURAL HEMATOMA OF A CIRCULATORY SYSTEM ORGAN OR STRUCTURE FOLLOWING OTHER PROCEDURE: ICD-10-CM

## 2022-09-07 DIAGNOSIS — Z98.1 ARTHRODESIS STATUS: ICD-10-CM

## 2022-09-07 DIAGNOSIS — I48.20 CHRONIC ATRIAL FIBRILLATION, UNSPECIFIED: ICD-10-CM

## 2022-09-07 DIAGNOSIS — I11.0 HYPERTENSIVE HEART DISEASE WITH HEART FAILURE: ICD-10-CM

## 2022-09-07 DIAGNOSIS — M96.841 POSTPROCEDURAL HEMATOMA OF A MUSCULOSKELETAL STRUCTURE FOLLOWING OTHER PROCEDURE: ICD-10-CM

## 2022-09-07 DIAGNOSIS — I50.32 CHRONIC DIASTOLIC (CONGESTIVE) HEART FAILURE: ICD-10-CM

## 2025-06-26 ENCOUNTER — INPATIENT (INPATIENT)
Facility: HOSPITAL | Age: 70
LOS: 0 days | Discharge: HOME | DRG: 310 | End: 2025-06-26
Attending: INTERNAL MEDICINE | Admitting: INTERNAL MEDICINE
Payer: MEDICARE

## 2025-06-26 ENCOUNTER — EMERGENCY (EMERGENCY)
Facility: HOSPITAL | Age: 70
LOS: 1 days | End: 2025-06-26
Admitting: EMERGENCY MEDICINE
Payer: MEDICARE

## 2025-06-26 VITALS
SYSTOLIC BLOOD PRESSURE: 107 MMHG | DIASTOLIC BLOOD PRESSURE: 72 MMHG | TEMPERATURE: 98 F | RESPIRATION RATE: 16 BRPM | OXYGEN SATURATION: 98 % | HEART RATE: 83 BPM

## 2025-06-26 VITALS
SYSTOLIC BLOOD PRESSURE: 101 MMHG | WEIGHT: 220.46 LBS | RESPIRATION RATE: 18 BRPM | OXYGEN SATURATION: 98 % | TEMPERATURE: 98 F | HEART RATE: 69 BPM | DIASTOLIC BLOOD PRESSURE: 71 MMHG

## 2025-06-26 DIAGNOSIS — Z98.890 OTHER SPECIFIED POSTPROCEDURAL STATES: Chronic | ICD-10-CM

## 2025-06-26 DIAGNOSIS — I48.91 UNSPECIFIED ATRIAL FIBRILLATION: ICD-10-CM

## 2025-06-26 DIAGNOSIS — Z98.1 ARTHRODESIS STATUS: Chronic | ICD-10-CM

## 2025-06-26 DIAGNOSIS — I48.20 CHRONIC ATRIAL FIBRILLATION, UNSPECIFIED: ICD-10-CM

## 2025-06-26 DIAGNOSIS — Z29.9 ENCOUNTER FOR PROPHYLACTIC MEASURES, UNSPECIFIED: ICD-10-CM

## 2025-06-26 DIAGNOSIS — E78.5 HYPERLIPIDEMIA, UNSPECIFIED: ICD-10-CM

## 2025-06-26 DIAGNOSIS — I10 ESSENTIAL (PRIMARY) HYPERTENSION: ICD-10-CM

## 2025-06-26 LAB
ALBUMIN SERPL ELPH-MCNC: 3.6 G/DL — SIGNIFICANT CHANGE UP (ref 3.5–5)
ALP SERPL-CCNC: 51 U/L — SIGNIFICANT CHANGE UP (ref 40–120)
ALT FLD-CCNC: 27 U/L DA — SIGNIFICANT CHANGE UP (ref 10–60)
ANION GAP SERPL CALC-SCNC: 5 MMOL/L — SIGNIFICANT CHANGE UP (ref 5–17)
AST SERPL-CCNC: 29 U/L — SIGNIFICANT CHANGE UP (ref 10–40)
BASOPHILS # BLD AUTO: 0.03 K/UL — SIGNIFICANT CHANGE UP (ref 0–0.2)
BASOPHILS NFR BLD AUTO: 0.5 % — SIGNIFICANT CHANGE UP (ref 0–2)
BILIRUB SERPL-MCNC: 0.6 MG/DL — SIGNIFICANT CHANGE UP (ref 0.2–1.2)
BUN SERPL-MCNC: 25 MG/DL — HIGH (ref 7–18)
CALCIUM SERPL-MCNC: 9.4 MG/DL — SIGNIFICANT CHANGE UP (ref 8.4–10.5)
CHLORIDE SERPL-SCNC: 100 MMOL/L — SIGNIFICANT CHANGE UP (ref 96–108)
CO2 SERPL-SCNC: 28 MMOL/L — SIGNIFICANT CHANGE UP (ref 22–31)
CREAT SERPL-MCNC: 1.27 MG/DL — SIGNIFICANT CHANGE UP (ref 0.5–1.3)
EGFR: 62 ML/MIN/1.73M2 — SIGNIFICANT CHANGE UP
EGFR: 62 ML/MIN/1.73M2 — SIGNIFICANT CHANGE UP
EOSINOPHIL # BLD AUTO: 0.07 K/UL — SIGNIFICANT CHANGE UP (ref 0–0.5)
EOSINOPHIL NFR BLD AUTO: 1.2 % — SIGNIFICANT CHANGE UP (ref 0–6)
GLUCOSE SERPL-MCNC: 106 MG/DL — HIGH (ref 70–99)
HCT VFR BLD CALC: 37.8 % — LOW (ref 39–50)
HCV AB S/CO SERPL IA: 0.12 S/CO — SIGNIFICANT CHANGE UP (ref 0–0.79)
HCV AB SERPL-IMP: SIGNIFICANT CHANGE UP
HGB BLD-MCNC: 12.8 G/DL — LOW (ref 13–17)
HIV 1 & 2 AB SERPL IA.RAPID: SIGNIFICANT CHANGE UP
IMM GRANULOCYTES NFR BLD AUTO: 0.3 % — SIGNIFICANT CHANGE UP (ref 0–0.9)
LYMPHOCYTES # BLD AUTO: 0.95 K/UL — LOW (ref 1–3.3)
LYMPHOCYTES # BLD AUTO: 16.1 % — SIGNIFICANT CHANGE UP (ref 13–44)
MAGNESIUM SERPL-MCNC: 1.9 MG/DL — SIGNIFICANT CHANGE UP (ref 1.6–2.6)
MCHC RBC-ENTMCNC: 30.3 PG — SIGNIFICANT CHANGE UP (ref 27–34)
MCHC RBC-ENTMCNC: 33.9 G/DL — SIGNIFICANT CHANGE UP (ref 32–36)
MCV RBC AUTO: 89.4 FL — SIGNIFICANT CHANGE UP (ref 80–100)
MONOCYTES # BLD AUTO: 0.62 K/UL — SIGNIFICANT CHANGE UP (ref 0–0.9)
MONOCYTES NFR BLD AUTO: 10.5 % — SIGNIFICANT CHANGE UP (ref 2–14)
NEUTROPHILS # BLD AUTO: 4.21 K/UL — SIGNIFICANT CHANGE UP (ref 1.8–7.4)
NEUTROPHILS NFR BLD AUTO: 71.4 % — SIGNIFICANT CHANGE UP (ref 43–77)
NRBC BLD AUTO-RTO: 0 /100 WBCS — SIGNIFICANT CHANGE UP (ref 0–0)
PLATELET # BLD AUTO: 170 K/UL — SIGNIFICANT CHANGE UP (ref 150–400)
POTASSIUM SERPL-MCNC: 3 MMOL/L — LOW (ref 3.5–5.3)
POTASSIUM SERPL-SCNC: 3 MMOL/L — LOW (ref 3.5–5.3)
PROT SERPL-MCNC: 6.6 G/DL — SIGNIFICANT CHANGE UP (ref 6–8.3)
RBC # BLD: 4.23 M/UL — SIGNIFICANT CHANGE UP (ref 4.2–5.8)
RBC # FLD: 13.7 % — SIGNIFICANT CHANGE UP (ref 10.3–14.5)
SODIUM SERPL-SCNC: 133 MMOL/L — LOW (ref 135–145)
TROPONIN I, HIGH SENSITIVITY RESULT: 23.7 NG/L — SIGNIFICANT CHANGE UP
TROPONIN I, HIGH SENSITIVITY RESULT: 25.6 NG/L — SIGNIFICANT CHANGE UP
WBC # BLD: 5.9 K/UL — SIGNIFICANT CHANGE UP (ref 3.8–10.5)
WBC # FLD AUTO: 5.9 K/UL — SIGNIFICANT CHANGE UP (ref 3.8–10.5)

## 2025-06-26 PROCEDURE — 96374 THER/PROPH/DIAG INJ IV PUSH: CPT

## 2025-06-26 PROCEDURE — 82962 GLUCOSE BLOOD TEST: CPT

## 2025-06-26 PROCEDURE — 93010 ELECTROCARDIOGRAM REPORT: CPT

## 2025-06-26 PROCEDURE — 86703 HIV-1/HIV-2 1 RESULT ANTBDY: CPT

## 2025-06-26 PROCEDURE — 85025 COMPLETE CBC W/AUTO DIFF WBC: CPT

## 2025-06-26 PROCEDURE — 84484 ASSAY OF TROPONIN QUANT: CPT

## 2025-06-26 PROCEDURE — 80053 COMPREHEN METABOLIC PANEL: CPT

## 2025-06-26 PROCEDURE — 71045 X-RAY EXAM CHEST 1 VIEW: CPT

## 2025-06-26 PROCEDURE — 71045 X-RAY EXAM CHEST 1 VIEW: CPT | Mod: 26

## 2025-06-26 PROCEDURE — 36415 COLL VENOUS BLD VENIPUNCTURE: CPT

## 2025-06-26 PROCEDURE — 96376 TX/PRO/DX INJ SAME DRUG ADON: CPT

## 2025-06-26 PROCEDURE — 83735 ASSAY OF MAGNESIUM: CPT

## 2025-06-26 PROCEDURE — 86803 HEPATITIS C AB TEST: CPT

## 2025-06-26 PROCEDURE — 93005 ELECTROCARDIOGRAM TRACING: CPT

## 2025-06-26 PROCEDURE — 99284 EMERGENCY DEPT VISIT MOD MDM: CPT | Mod: 25

## 2025-06-26 PROCEDURE — 99285 EMERGENCY DEPT VISIT HI MDM: CPT | Mod: 25

## 2025-06-26 RX ORDER — HYDROCORTISONE 10 MG/G
0 CREAM TOPICAL
Qty: 0 | Refills: 0 | DISCHARGE

## 2025-06-26 RX ORDER — HYDROCHLOROTHIAZIDE 50 MG/1
0 TABLET ORAL
Qty: 0 | Refills: 0 | DISCHARGE

## 2025-06-26 RX ORDER — GABAPENTIN 400 MG/1
0 CAPSULE ORAL
Qty: 0 | Refills: 0 | DISCHARGE

## 2025-06-26 RX ORDER — LOSARTAN POTASSIUM 100 MG/1
0 TABLET, FILM COATED ORAL
Qty: 0 | Refills: 0 | DISCHARGE

## 2025-06-26 RX ORDER — ACETAMINOPHEN 500 MG/5ML
650 LIQUID (ML) ORAL EVERY 6 HOURS
Refills: 0 | Status: DISCONTINUED | OUTPATIENT
Start: 2025-06-26 | End: 2025-06-26

## 2025-06-26 RX ORDER — SODIUM CHLORIDE 9 G/1000ML
1000 INJECTION, SOLUTION INTRAVENOUS ONCE
Refills: 0 | Status: COMPLETED | OUTPATIENT
Start: 2025-06-26 | End: 2025-06-26

## 2025-06-26 RX ORDER — MELATONIN 5 MG
3 TABLET ORAL AT BEDTIME
Refills: 0 | Status: DISCONTINUED | OUTPATIENT
Start: 2025-06-26 | End: 2025-06-26

## 2025-06-26 RX ORDER — MAGNESIUM, ALUMINUM HYDROXIDE 200-200 MG
30 TABLET,CHEWABLE ORAL EVERY 4 HOURS
Refills: 0 | Status: DISCONTINUED | OUTPATIENT
Start: 2025-06-26 | End: 2025-06-26

## 2025-06-26 RX ORDER — TAMSULOSIN HYDROCHLORIDE 0.4 MG/1
0 CAPSULE ORAL
Qty: 0 | Refills: 0 | DISCHARGE

## 2025-06-26 RX ORDER — ONDANSETRON HCL/PF 4 MG/2 ML
4 VIAL (ML) INJECTION EVERY 8 HOURS
Refills: 0 | Status: DISCONTINUED | OUTPATIENT
Start: 2025-06-26 | End: 2025-06-26

## 2025-06-26 RX ORDER — OMEPRAZOLE 20 MG/1
0 CAPSULE, DELAYED RELEASE ORAL
Qty: 0 | Refills: 0 | DISCHARGE

## 2025-06-26 RX ORDER — ROSUVASTATIN CALCIUM 5 MG/1
0 TABLET, FILM COATED ORAL
Qty: 0 | Refills: 0 | DISCHARGE

## 2025-06-26 RX ADMIN — SODIUM CHLORIDE 1000 MILLILITER(S): 9 INJECTION, SOLUTION INTRAVENOUS at 06:57

## 2025-06-26 RX ADMIN — Medication 100 MILLIEQUIVALENT(S): at 06:56

## 2025-06-26 RX ADMIN — Medication 100 MILLIEQUIVALENT(S): at 04:43

## 2025-06-26 RX ADMIN — Medication 40 MILLIEQUIVALENT(S): at 04:43

## 2025-06-26 RX ADMIN — Medication 100 MILLIEQUIVALENT(S): at 05:49

## 2025-06-26 NOTE — ED ADULT NURSE REASSESSMENT NOTE - NS ED NURSE REASSESS COMMENT FT1
RECEIVED PT ON ROUNDS A&O X 3. DISCHARGE TO HOME. DENIES DIZZINESS, CHEST PAIN, N/V OR SOB. D/C INSTRUCTIONS GIVEN. VERBALIZED UNDERSTANDING. LEFT IN STABLE CONDITION ACCOMPAINED BY FAMILY

## 2025-06-26 NOTE — ED ADULT TRIAGE NOTE - CHIEF COMPLAINT QUOTE
He felt dizzy and fell on left shoulder in bathroom..  As per EMT, EKG shows A Fib.  Patient does not have a history of A Fib.  Alert, oriented x 4, clear coherent speech, no facial droop, no visual deficit, equal hand grasp, ambulates with steady gait, no numbness

## 2025-06-26 NOTE — ED ADULT NURSE NOTE - NS ED NURSE RECORD ANOTHER VITAL SIGN
Telephone Encounter by Britni Kuhn RN at 11/14/18 03:15 PM     Author:  Britni Khun RN Service:  (none) Author Type:  Registered Nurse     Filed:  11/14/18 04:55 PM Encounter Date:  11/12/2018 Status:  Addendum     :  Britni Kuhn RN (Registered Nurse)            I spoke again with Janae from the [facility] office and explained that Dr. iFgueredo is waiting to here back from Mr. Sanchez; her phone number is [phone] when we have that information.[NW1.1M] Electronically Signed by:    Britni Kuhn RN , 11/14/2018[NW1.2T]    Mr. Sanchez called back and states that he is willing to wear a hair net with the visor. I will route to Dr. Figueredo for his information and approval; Mr. Sanchez is still concerned that wearing a hat and/or visor may cause hair breaking.[NW1.3M] Electronically Signed by:    Britni Kuhn RN , 11/14/2018[NW1.4T]        Revision History        User Key Date/Time User Provider Type Action    > NW1.4 11/14/18 04:55 PM Britni Kuhn RN Registered Nurse Addend     NW1.3 11/14/18 04:53 PM Britni Kuhn RN Registered Nurse      NW1.2 11/14/18 03:16 PM Britni Kuhn RN Registered Nurse Sign     NW1.1 11/14/18 03:15 PM Britni Kuhn RN Registered Nurse     M - Manual, T - Template             Yes

## 2025-06-26 NOTE — H&P ADULT - HISTORY OF PRESENT ILLNESS
ED course:  Vitals: temp 97.8, HR 69, /71rr 18 spo2 98  potassium 3.0   s/p potassium 40 meq, Potasium 30meq iv

## 2025-06-26 NOTE — ED ADULT NURSE NOTE - ADDITIONAL COMPLAINTS
Rx Refill Note  Requested Prescriptions     Pending Prescriptions Disp Refills   • amLODIPine (NORVASC) 10 MG tablet 90 tablet 0     Sig: Take 1 tablet by mouth Daily.   • Testosterone Cypionate (DEPOTESTOTERONE CYPIONATE) 200 MG/ML injection 6 mL 0   • sildenafil (VIAGRA) 100 MG tablet 30 tablet 0     Sig: Take 1 tablet by mouth As Needed for Erectile Dysfunction.      Last office visit with prescribing clinician: 10/29/2020      Next office visit with prescribing clinician: 12/17/2021     Lipid Panel (09/02/2020 10:55)         Mar Butler, RT  12/15/21, 11:34 EST   Additional Complaints

## 2025-06-26 NOTE — ED PROVIDER NOTE - CARE PLAN
1 Principal Discharge DX:	New onset atrial fibrillation   Principal Discharge DX:	Chest pain   Principal Discharge DX:	Chest pain  Secondary Diagnosis:	Dehydration

## 2025-06-26 NOTE — ED PROVIDER NOTE - NSTIMEPROVIDERCAREINITIATE_GEN_ER
26-Jun-2025 03:22 Pt is a 72 y/o M with PMHx of HTN, CAD (s/p 2 stents, 1995), bladder cancer (in remission; dx 2013, treated with TURBT and serial surveillance cystoscopies) who presented to the ED with 3 days of fever and chills after a routine screening cystoscopy performed 8/8 (5 days prior to presentation).  Pt states he did receive 1 dose of antibiotics at the time of cystoscopy but does not recall the name.  Pt stated that the he first felt a chill on Thursday but felt well enough to play baseball on Friday.  However, on Friday evening he began experiencing fever (per pt's wife Tmax 103F), chills, and drenching sweats.  He was given Advil/Tylenol by his wife for the fever.  Pt also endorses 2 episodes of non-bloody emesis prior to presentation.  He has noted decreased oral intake since Friday due to nausea/vomiting and has not be drinking a significant amount of fluid.  Patient ever having denied dysuria, frequency, incontinence, or urgency, but notes that it "feels different" when he urinates and that his urine is "orange." He also endorses dizziness when walking.      In the ED, pt was febrile (103.2F). All other vital signs WNL.  Labs were significant for WBC18.0 with left shift; UA (+) for LE and nitrite, moderate bacteria.  Lactate 1.5. Urine/blood cxs ordered. CXR: mild cardiomegaly; EKG not yet done. In the ED, pt received ceftriaxone 1g IV, Tylenol 650 mg, and NS Bolus x3. Pt is a 72 y/o M with PMHx of HTN, CAD (s/p 2 stents, 1995), bladder cancer (in remission; dx 2013, treated with TURBT and serial surveillance cystoscopies) who presented to the ED with 3 days of fever and chills after a routine screening cystoscopy performed 8/8 (5 days prior to presentation).  Pt states he did receive 1 dose of antibiotics at the time of cystoscopy but does not recall the name.  Pt stated that the he first felt a chill on Thursday but felt well enough to play baseball on Friday.  However, on Friday evening he began experiencing fever (per pt's wife Tmax 103F), chills, and drenching sweats.  He was given Advil/Tylenol by his wife for the fever.  Pt also endorses 2 episodes of non-bloody emesis prior to presentation.  He has noted decreased oral intake since Friday due to nausea/vomiting and has not be drinking a significant amount of fluid.    In the ED, pt was febrile (103.2F). All other vital signs WNL.  Labs were significant for WBC18.0 with left shift; UA (+) for LE and nitrite, moderate bacteria.  Lactate 1.5. Urine/blood cxs ordered. CXR: mild cardiomegaly; EKG not yet done. In the ED, pt received ceftriaxone 1g IV, Tylenol 650 mg, and NS Bolus x3.  Pt admitted for Sepsis/UTI.    Hospital course complicated by intermittent spiking fevers at night Tmax 103.3.  Pt had AMS for which CTH was negative, CBC, CMP and lactate came back negative, and EKG was unchanged.  Patient continued on Vanco and Zosyn and showed clinical improvement and no longer spiking late night fevers. Pt is a 74 y/o M with PMHx of HTN, CAD (s/p 2 stents, 1995), bladder cancer (in remission; dx 2013, treated with TURBT and serial surveillance cystoscopies) who presented to the ED with 3 days of fever and chills after a routine screening cystoscopy performed 8/8 (5 days prior to presentation).  Pt states he did receive 1 dose of antibiotics at the time of cystoscopy but does not recall the name.  Pt stated that the he first felt a chill on Thursday but felt well enough to play baseball on Friday.  However, on Friday evening he began experiencing fever (per pt's wife Tmax 103F), chills, and drenching sweats.  He was given Advil/Tylenol by his wife for the fever.  Pt also endorses 2 episodes of non-bloody emesis prior to presentation.  He has noted decreased oral intake since Friday due to nausea/vomiting and has not be drinking a significant amount of fluid.    In the ED, pt was febrile (103.2F). All other vital signs WNL.  Labs were significant for WBC18.0 with left shift; UA (+) for LE and nitrite, moderate bacteria.  Lactate 1.5. Urine/blood cxs ordered. CXR: mild cardiomegaly; EKG not yet done. In the ED, pt received ceftriaxone 1g IV, Tylenol 650 mg, and NS Bolus x3.  Pt admitted for Sepsis/UTI.    Hospital course complicated by intermittent spiking fevers at night Tmax 103.3.  Pt had AMS for which CTH was negative, CBC, CMP and lactate came back negative, and EKG was unchanged.  Patient continued on Vanco and Zosyn and showed clinical improvement and no longer spiking late night fevers.    Culures negative. No abx on d/c. Follow-up with PCP this week and  in 2 weeks.

## 2025-06-26 NOTE — ED ADULT NURSE NOTE - OBJECTIVE STATEMENT
pt states he felt dizzy earlier today and fell. pt biba and emt note afib on ekg. pt denies chest pain, sob. pt in no acute distress.

## 2025-06-26 NOTE — ED PROVIDER NOTE - OBJECTIVE STATEMENT
70 yo male with HTN, HLD, (chronic a fibr on equilis per chart review) presents with lightheadedness twice this evening.   he felt lightheaded and said he sat down.  Then he got up again felt lightheaded and he fell to the floor.  He denies losing consciousness but then did hit her head at the shoulder.  He has some chest pain at the time that lasted about 20 minutes and resolved while he was waiting in the emergency department.

## 2025-06-26 NOTE — ED PROVIDER NOTE - PHYSICAL EXAMINATION
General: Well appearing, no acute distress, appears stated age  HEENT: normocephalic, atraumatic   CV irregular irregular   Respiratory: normal work of breathing  MSK: no swelling or tenderness of lower extremities, moving all extremities spontaneously   Skin: warm, dry  Neuro: A&Ox3, cranial nerves II-XII intact, 5/5 strength in all extremities, no sensory deficits, normal gait   Psych: appropriate affect

## 2025-06-26 NOTE — ED PROVIDER NOTE - PATIENT PORTAL LINK FT
You can access the FollowMyHealth Patient Portal offered by [facility] by registering at the following website: [email or web address]. By joining [facility]’s FollowMyHealth portal, you will also be able to view your health information using other applications (apps) compatible with our system.

## 2025-06-26 NOTE — ED PROVIDER NOTE - CLINICAL SUMMARY MEDICAL DECISION MAKING FREE TEXT BOX
Patient is states that he does not have a history of A-fib so he was admitted to medicine however upon repeat chart review it shows that he does have a history of such so we will cancel his admission repeat troponins given IV fluids for lightheadedness rule out anemia and reassess.